# Patient Record
Sex: FEMALE | Race: WHITE | NOT HISPANIC OR LATINO
[De-identification: names, ages, dates, MRNs, and addresses within clinical notes are randomized per-mention and may not be internally consistent; named-entity substitution may affect disease eponyms.]

---

## 2018-03-27 ENCOUNTER — TRANSCRIPTION ENCOUNTER (OUTPATIENT)
Age: 63
End: 2018-03-27

## 2018-05-22 ENCOUNTER — RECORD ABSTRACTING (OUTPATIENT)
Age: 63
End: 2018-05-22

## 2018-05-22 DIAGNOSIS — Z82.49 FAMILY HISTORY OF ISCHEMIC HEART DISEASE AND OTHER DISEASES OF THE CIRCULATORY SYSTEM: ICD-10-CM

## 2018-05-22 DIAGNOSIS — Z78.9 OTHER SPECIFIED HEALTH STATUS: ICD-10-CM

## 2018-05-22 DIAGNOSIS — Z84.1 FAMILY HISTORY OF DISORDERS OF KIDNEY AND URETER: ICD-10-CM

## 2018-05-22 DIAGNOSIS — Z86.59 PERSONAL HISTORY OF OTHER MENTAL AND BEHAVIORAL DISORDERS: ICD-10-CM

## 2018-05-22 DIAGNOSIS — Z82.3 FAMILY HISTORY OF STROKE: ICD-10-CM

## 2018-05-22 DIAGNOSIS — Z87.19 PERSONAL HISTORY OF OTHER DISEASES OF THE DIGESTIVE SYSTEM: ICD-10-CM

## 2018-05-22 DIAGNOSIS — L65.9 NONSCARRING HAIR LOSS, UNSPECIFIED: ICD-10-CM

## 2018-05-22 RX ORDER — CHOLECALCIFEROL (VITAMIN D3) 25 MCG
TABLET ORAL
Refills: 0 | Status: ACTIVE | COMMUNITY

## 2018-05-22 RX ORDER — RIBOFLAVIN (VITAMIN B2) 100 MG
100 TABLET ORAL
Refills: 0 | Status: ACTIVE | COMMUNITY

## 2018-06-21 ENCOUNTER — APPOINTMENT (OUTPATIENT)
Dept: BREAST CENTER | Facility: CLINIC | Age: 63
End: 2018-06-21
Payer: COMMERCIAL

## 2018-06-21 VITALS
SYSTOLIC BLOOD PRESSURE: 95 MMHG | HEART RATE: 65 BPM | BODY MASS INDEX: 24.98 KG/M2 | WEIGHT: 134 LBS | DIASTOLIC BLOOD PRESSURE: 63 MMHG | HEIGHT: 61.5 IN

## 2018-06-21 DIAGNOSIS — N63.0 UNSPECIFIED LUMP IN UNSPECIFIED BREAST: ICD-10-CM

## 2018-06-21 PROCEDURE — 99213 OFFICE O/P EST LOW 20 MIN: CPT

## 2018-06-21 RX ORDER — CITALOPRAM HYDROBROMIDE 20 MG/1
20 TABLET, FILM COATED ORAL DAILY
Refills: 0 | Status: DISCONTINUED | COMMUNITY
End: 2018-06-21

## 2018-11-30 ENCOUNTER — RECORD ABSTRACTING (OUTPATIENT)
Age: 63
End: 2018-11-30

## 2018-11-30 DIAGNOSIS — Z87.19 PERSONAL HISTORY OF OTHER DISEASES OF THE DIGESTIVE SYSTEM: ICD-10-CM

## 2018-11-30 DIAGNOSIS — Z82.49 FAMILY HISTORY OF ISCHEMIC HEART DISEASE AND OTHER DISEASES OF THE CIRCULATORY SYSTEM: ICD-10-CM

## 2018-11-30 DIAGNOSIS — Z83.3 FAMILY HISTORY OF DIABETES MELLITUS: ICD-10-CM

## 2018-11-30 DIAGNOSIS — Z82.62 FAMILY HISTORY OF OSTEOPOROSIS: ICD-10-CM

## 2018-11-30 DIAGNOSIS — Z81.8 FAMILY HISTORY OF OTHER MENTAL AND BEHAVIORAL DISORDERS: ICD-10-CM

## 2018-11-30 DIAGNOSIS — Z87.440 PERSONAL HISTORY OF URINARY (TRACT) INFECTIONS: ICD-10-CM

## 2018-11-30 DIAGNOSIS — Z83.438 FAMILY HISTORY OF OTHER DISORDER OF LIPOPROTEIN METABOLISM AND OTHER LIPIDEMIA: ICD-10-CM

## 2018-11-30 DIAGNOSIS — Z83.49 FAMILY HISTORY OF OTHER ENDOCRINE, NUTRITIONAL AND METABOLIC DISEASES: ICD-10-CM

## 2018-11-30 DIAGNOSIS — Z83.518 FAMILY HISTORY OF OTHER SPECIFIED EYE DISORDER: ICD-10-CM

## 2018-11-30 LAB — CYTOLOGY CVX/VAG DOC THIN PREP: NORMAL

## 2018-11-30 RX ORDER — MULTIVIT-MIN/IRON/FOLIC ACID/K 18-600-40
400 CAPSULE ORAL
Refills: 0 | Status: ACTIVE | COMMUNITY

## 2018-12-05 ENCOUNTER — APPOINTMENT (OUTPATIENT)
Dept: PODIATRY | Facility: CLINIC | Age: 63
End: 2018-12-05
Payer: COMMERCIAL

## 2018-12-05 VITALS
HEIGHT: 61 IN | SYSTOLIC BLOOD PRESSURE: 100 MMHG | DIASTOLIC BLOOD PRESSURE: 64 MMHG | BODY MASS INDEX: 25.3 KG/M2 | WEIGHT: 134 LBS

## 2018-12-05 DIAGNOSIS — R22.40 LOCALIZED SWELLING, MASS AND LUMP, UNSPECIFIED LOWER LIMB: ICD-10-CM

## 2018-12-05 PROCEDURE — L3000: CPT | Mod: RT

## 2018-12-05 RX ORDER — CRANBERRY FRUIT EXTRACT 200 MG
CAPSULE ORAL
Refills: 0 | Status: DISCONTINUED | COMMUNITY
End: 2018-12-05

## 2018-12-05 RX ORDER — CITALOPRAM 20 MG/1
20 TABLET, FILM COATED ORAL
Refills: 0 | Status: DISCONTINUED | COMMUNITY
End: 2018-12-05

## 2019-01-19 ENCOUNTER — TRANSCRIPTION ENCOUNTER (OUTPATIENT)
Age: 64
End: 2019-01-19

## 2019-01-24 ENCOUNTER — APPOINTMENT (OUTPATIENT)
Dept: BREAST CENTER | Facility: CLINIC | Age: 64
End: 2019-01-24
Payer: COMMERCIAL

## 2019-01-24 VITALS
BODY MASS INDEX: 25.63 KG/M2 | HEART RATE: 73 BPM | SYSTOLIC BLOOD PRESSURE: 103 MMHG | DIASTOLIC BLOOD PRESSURE: 66 MMHG | WEIGHT: 134 LBS | HEIGHT: 60.5 IN

## 2019-01-24 DIAGNOSIS — Z87.410 PERSONAL HISTORY OF CERVICAL DYSPLASIA: ICD-10-CM

## 2019-01-24 DIAGNOSIS — Z87.19 PERSONAL HISTORY OF OTHER DISEASES OF THE DIGESTIVE SYSTEM: ICD-10-CM

## 2019-01-24 PROCEDURE — 99214 OFFICE O/P EST MOD 30 MIN: CPT

## 2019-01-24 RX ORDER — ASCORBIC ACID 125 MG
TABLET,CHEWABLE ORAL
Refills: 0 | Status: ACTIVE | COMMUNITY

## 2019-01-24 RX ORDER — PSYLLIUM HUSK 0.4 G
CAPSULE ORAL
Refills: 0 | Status: DISCONTINUED | COMMUNITY
End: 2019-01-24

## 2019-01-24 NOTE — HISTORY OF PRESENT ILLNESS
[FreeTextEntry1] : S/P R Br Bx w/NL (5/16/14): No resid papliloma\par S/P R Sono Core Bx (4/2/14): +papillary lesion\par +FH Br Ca (M. Aunt x 2 40's, M. FC 40's)\par TC Risk: 21.4%\par No MH/FH changes. ROS reviewed/discussed. Taking Vit D. Vit D level (3/17): 55. Last Bone Densitometry (2016): +osteopenia\par Mammo/Sono (10/4/18): NSF

## 2019-01-24 NOTE — PHYSICAL EXAM

## 2019-03-22 ENCOUNTER — APPOINTMENT (OUTPATIENT)
Dept: PODIATRY | Facility: CLINIC | Age: 64
End: 2019-03-22
Payer: COMMERCIAL

## 2019-03-22 VITALS
HEIGHT: 60 IN | WEIGHT: 134 LBS | DIASTOLIC BLOOD PRESSURE: 70 MMHG | SYSTOLIC BLOOD PRESSURE: 110 MMHG | BODY MASS INDEX: 26.31 KG/M2

## 2019-03-22 PROCEDURE — 99213 OFFICE O/P EST LOW 20 MIN: CPT | Mod: 25

## 2019-03-22 PROCEDURE — 73630 X-RAY EXAM OF FOOT: CPT | Mod: LT

## 2019-03-22 NOTE — REVIEW OF SYSTEMS
[As Noted in HPI] : as noted in HPI [Negative] : Neurological [FreeTextEntry9] : Large hallux valgus noted bilateral

## 2019-03-22 NOTE — HISTORY OF PRESENT ILLNESS
[FreeTextEntry1] : 4 months area of cc is the medial aspect of the left great toe and toes 2/3She states she gets combination of burning and aching

## 2019-08-08 ENCOUNTER — APPOINTMENT (OUTPATIENT)
Dept: PODIATRY | Facility: CLINIC | Age: 64
End: 2019-08-08
Payer: COMMERCIAL

## 2019-08-08 VITALS
DIASTOLIC BLOOD PRESSURE: 60 MMHG | BODY MASS INDEX: 26.5 KG/M2 | HEIGHT: 60 IN | WEIGHT: 135 LBS | SYSTOLIC BLOOD PRESSURE: 110 MMHG

## 2019-08-08 DIAGNOSIS — M77.9 ENTHESOPATHY, UNSPECIFIED: ICD-10-CM

## 2019-08-08 PROCEDURE — L3000: CPT | Mod: LT

## 2019-08-08 PROCEDURE — 99213 OFFICE O/P EST LOW 20 MIN: CPT | Mod: 25

## 2019-08-08 NOTE — HISTORY OF PRESENT ILLNESS
[FreeTextEntry1] : Patient has had a long standing history of chronic pain to the bottom of the lesser metatarsals bilateral well controlled with orthotic therapy. Patient does not want to take anti-inflammatory medication and states she could use another pair of orthotics she feels that the current pair does such a good job he would like to have them in a different pair of shoes. She also complains of ongoing pain in the plantar aspect of the right great toe however the examination is negative her convenience as a possible soft tissue the flexor tendon pathology

## 2019-08-08 NOTE — PROCEDURE
[FreeTextEntry1] : had a lengthy discussion with the patient regarding the diagnosis etiology and differential diagnosis as well as treatment options for the presenting problem. Risks alternatives and benefits of treatment ranging from conservative to surgical explained in great detail. I also explained the progression of treatment from conservative to possible surgical treatment options as well as the benefits of each. I do stress conservative treatment if in fact conservative treatment is an option until it no longer provides relief. Over-the-counter products medications padding, and splinting were reviewed as well. All questions asked and answered appropriately to the patient's satisfaction\par I have applied offloading pads to the patient's foot and have given them several samples to continue to use. I have also advised the patient that they can certainly purchase these from an outside vendor and if they are willing to do that and the name and number was supplied\par After a lengthy discussion with the patient regarding the possible benefits of orthotics and what we hope to achieve with them as it relates to their diagnosis the patient has agreed to be casted for the devices, The patient was then casted for a pair of custom functional foot orthoses with the subtalar joint in neutral, the forefoot locked, The patient was advised that they will be notified when the orthotics are returned from the laboratory, Should there be any questions or concerns they were advised to contact the office immediately, Educational literature regarding orthotics were dispensed, Included in the casting for orthotics was an overall gait exam and biomechanical evaluation\par

## 2019-08-08 NOTE — PHYSICAL EXAM
[General Appearance - Alert] : alert [General Appearance - In No Acute Distress] : in no acute distress [Full Pulse] : the pedal pulses are present [Edema] : there was no peripheral edema [Skin Color & Pigmentation] : normal skin color and pigmentation [Skin Turgor] : normal skin turgor [] : no rash [Deep Tendon Reflexes (DTR)] : deep tendon reflexes were 2+ and symmetric [Sensation] : the sensory exam was normal to light touch and pinprick [No Focal Deficits] : no focal deficits [Oriented To Time, Place, And Person] : oriented to person, place, and time [Impaired Insight] : insight and judgment were intact [Affect] : the affect was normal [FreeTextEntry1] : I had a lengthy discussion with the patient regarding bursitis the etiology and treatment options. I did explain to the patient what this means in medical as well as lay in terms and treatment options for bursitis. Since bursitis is an inflammation treatment options were reviewed including but were not limited to physical therapy, anti-inflammatory medication by mouth, steroid injection locally, offloading with orthotics, padding to the area, or a combination of all the above. The discussion with the patient regarding treatment options with and complete with all questions asked and answered appropriately. toe pain:  I had a lengthy discussion with the patient all questions asked and answered appropriately regarding a diagnosis of tendinitis/tenosynovitis. I did explain to the patient etiology of this diagnosis as well as treatment options. It is an inflammation of the tendons on the top of the foot. In many cases both pressure as well as excessive motion can cause inflammation of the tendons and is treated with a combination of immobilization, anti-inflammatories, physical therapy, or combination of all of the above. Patient was given at home exercises to do and I did explain to them that there is a need for strict compliance to my chosen treatment options.All questions asked and answered appropriately to the patient's satisfaction. , A complete and thorough evaluation of the type of shoes they should be wearing and type of shoes for this time of year was discussed with patient.\par

## 2019-08-15 ENCOUNTER — APPOINTMENT (OUTPATIENT)
Dept: BREAST CENTER | Facility: CLINIC | Age: 64
End: 2019-08-15
Payer: COMMERCIAL

## 2019-08-15 VITALS
SYSTOLIC BLOOD PRESSURE: 109 MMHG | BODY MASS INDEX: 24.87 KG/M2 | WEIGHT: 130 LBS | HEART RATE: 71 BPM | HEIGHT: 60.5 IN | DIASTOLIC BLOOD PRESSURE: 68 MMHG

## 2019-08-15 PROCEDURE — 99214 OFFICE O/P EST MOD 30 MIN: CPT

## 2019-08-15 NOTE — HISTORY OF PRESENT ILLNESS
[FreeTextEntry1] : S/P R Br Bx w/NL (5/16/14): No resid papliloma\par S/P R Sono Core Bx (4/2/14): +papillary lesion\par +FH Br Ca (M. Aunt x 2 40's, M. FC 40's)\par TC Risk: 21.4%\par No MH/FH changes. ROS reviewed/discussed. Taking Vit D. Vit D level (3/17): 55. Last Bone Densitometry (2016): +osteopenia\par Mammo/Sono (10/4/18): FG, NSF

## 2019-08-15 NOTE — PHYSICAL EXAM
[Normocephalic] : normocephalic [Atraumatic] : atraumatic [Supple] : supple [No Cervical Adenopathy] : no cervical adenopathy [No Supraclavicular Adenopathy] : no supraclavicular adenopathy [No Thyromegaly] : no thyromegaly [Normal Sinus Rhythm] : normal sinus rhythm [Examined in the supine and seated position] : examined in the supine and seated position [No dominant masses] : no dominant masses in right breast  [No dominant masses] : no dominant masses left breast [No Nipple Retraction] : no left nipple retraction [No Nipple Discharge] : no left nipple discharge [No Axillary Lymphadenopathy] : no left axillary lymphadenopathy [No Edema] : no edema [No Rashes] : no rashes [No Ulceration] : no ulceration

## 2019-09-16 ENCOUNTER — APPOINTMENT (OUTPATIENT)
Dept: PODIATRY | Facility: CLINIC | Age: 64
End: 2019-09-16
Payer: COMMERCIAL

## 2019-09-16 DIAGNOSIS — M77.41 METATARSALGIA, RIGHT FOOT: ICD-10-CM

## 2019-09-16 DIAGNOSIS — M77.42 METATARSALGIA, RIGHT FOOT: ICD-10-CM

## 2019-09-16 PROCEDURE — 99212 OFFICE O/P EST SF 10 MIN: CPT | Mod: NC

## 2019-09-16 PROCEDURE — ZZZZZ: CPT

## 2019-09-16 NOTE — HISTORY OF PRESENT ILLNESS
[FreeTextEntry1] : Patient has had a long standing history of chronic pain to the bottom of the lesser metatarsals bilateral well controlled with orthotic therapy.

## 2019-09-26 ENCOUNTER — TRANSCRIPTION ENCOUNTER (OUTPATIENT)
Age: 64
End: 2019-09-26

## 2019-10-07 ENCOUNTER — RESULT REVIEW (OUTPATIENT)
Age: 64
End: 2019-10-07

## 2020-02-14 ENCOUNTER — RESULT REVIEW (OUTPATIENT)
Age: 65
End: 2020-02-14

## 2020-02-14 ENCOUNTER — APPOINTMENT (OUTPATIENT)
Dept: HEMATOLOGY ONCOLOGY | Facility: CLINIC | Age: 65
End: 2020-02-14
Payer: COMMERCIAL

## 2020-02-14 VITALS
WEIGHT: 135 LBS | SYSTOLIC BLOOD PRESSURE: 102 MMHG | TEMPERATURE: 98.5 F | BODY MASS INDEX: 26.5 KG/M2 | DIASTOLIC BLOOD PRESSURE: 59 MMHG | HEIGHT: 60 IN | RESPIRATION RATE: 18 BRPM | HEART RATE: 62 BPM | OXYGEN SATURATION: 95 %

## 2020-02-14 DIAGNOSIS — M19.90 UNSPECIFIED OSTEOARTHRITIS, UNSPECIFIED SITE: ICD-10-CM

## 2020-02-14 PROCEDURE — 99205 OFFICE O/P NEW HI 60 MIN: CPT

## 2020-02-14 NOTE — PHYSICAL EXAM
[Fully active, able to carry on all pre-disease performance without restriction] : Status 0 - Fully active, able to carry on all pre-disease performance without restriction [Normal] : affect appropriate [de-identified] : swollen metacarpophalengeal joints

## 2020-02-14 NOTE — ASSESSMENT
[FreeTextEntry1] : Leucopenia\par Neutropeniia +/- lymphopenia\par At least since 3/2018\par PARTHA positive 1:320\par \par Extensively discussed about the differential diagnosis\par Her recent work up reviewed and discussed- B12, folate, copper, tsh normal\par Send ESR, CRP, CTD cascade, ANCA,  Hepatitis, HIV, myeloma panel, [facility] flow cytometry\par Suspect bone marrow suppression from systemic inflammation (autoimmune or connective tissue disorder- inflamed metacarpophalangeal joint)\par Refer to rheumatology\par If no obvious rheum diagnosis- will consider bone marrow\par \par Iron deficiency\par She had colonoscopy with Dr Oliveira in 2018\par No fatigue or tiredness\par \par Follow up in 3 weeks. No labs

## 2020-02-14 NOTE — CONSULT LETTER
[Dear  ___] : Dear  [unfilled], [Consult Letter:] : I had the pleasure of evaluating your patient, [unfilled]. [Please see my note below.] : Please see my note below. [Consult Closing:] : Thank you very much for allowing me to participate in the care of this patient.  If you have any questions, please do not hesitate to contact me. [Sincerely,] : Sincerely, [FreeTextEntry3] : Osito Lyn MD, MPH\par Attending Physician\par Hematology Oncology\par [facility] [place]\par [facility]\par

## 2020-02-14 NOTE — HISTORY OF PRESENT ILLNESS
[de-identified] : Ms. Radha Gates is very pleasant 64 year old female here for further evaluation for chronic neutropenia.\par \par Patient with history of arthritis and positive PARTHA who has intermittent neutropenia in the last two years with latest labs on 1/8/2020 showed wbc 2.8 hgb 12.1 hct 35.6 MCV - 94.4 Plts 178 AnC 1.2  Lymph # 1.2 She also had Ferritin of 40.4 on 3/28/2018 but never on supplement.\par  \par Patient with  maternal aunt with breast at age 40s and other aunt at 88.   \par No fevers chills night sweats\par No fatigue, tiredness, apathy\par No appetite loss or weight loss/weight gain\par No chest pain, shortness of breath, palpitation, dizziness\par No abdominal pain, nausea, vomiting, diarrhea, constipation\par No bright red blood per rectum, hematemesis or melena\par No hematuria, dysuria, frequency, urgency\par No headaches, visual changes, focal weakness, tingling, numbness\par \par \par

## 2020-02-14 NOTE — REVIEW OF SYSTEMS
[Constipation] : constipation [Joint Pain] : joint pain [Joint Stiffness] : joint stiffness [Negative] : Allergic/Immunologic [FreeTextEntry9] : +right shoulder impingement

## 2020-03-12 ENCOUNTER — APPOINTMENT (OUTPATIENT)
Dept: BREAST CENTER | Facility: CLINIC | Age: 65
End: 2020-03-12
Payer: COMMERCIAL

## 2020-03-12 VITALS
BODY MASS INDEX: 25.05 KG/M2 | HEIGHT: 60.5 IN | SYSTOLIC BLOOD PRESSURE: 109 MMHG | WEIGHT: 131 LBS | HEART RATE: 71 BPM | DIASTOLIC BLOOD PRESSURE: 60 MMHG

## 2020-03-12 PROCEDURE — 99214 OFFICE O/P EST MOD 30 MIN: CPT

## 2020-03-12 NOTE — HISTORY OF PRESENT ILLNESS
[FreeTextEntry1] : S/P R Br Bx w/NL (5/16/14): No resid papliloma\par S/P R Sono Core Bx (4/2/14): +papillary lesion\par +FH Br Ca (M. Aunt x 2 40's (BRCA-), M. FC 40's (BRCA-))\par TC Risk: 21.4%\par No MH/FH changes. ROS reviewed/discussed. Taking Vit D. Vit D level (3/17): 55. Last Bone Densitometry (2016): +osteopenia\par Mammo/Sono (10/7/19): FG, NSF

## 2020-03-13 ENCOUNTER — APPOINTMENT (OUTPATIENT)
Dept: HEMATOLOGY ONCOLOGY | Facility: CLINIC | Age: 65
End: 2020-03-13
Payer: COMMERCIAL

## 2020-03-13 VITALS
TEMPERATURE: 98.4 F | HEIGHT: 60.47 IN | SYSTOLIC BLOOD PRESSURE: 97 MMHG | BODY MASS INDEX: 25.95 KG/M2 | RESPIRATION RATE: 16 BRPM | WEIGHT: 135.7 LBS | HEART RATE: 62 BPM | OXYGEN SATURATION: 99 % | DIASTOLIC BLOOD PRESSURE: 65 MMHG

## 2020-03-13 PROCEDURE — 99213 OFFICE O/P EST LOW 20 MIN: CPT

## 2020-03-13 NOTE — ASSESSMENT
[FreeTextEntry1] : Leucopenia\par Neutropeniia +/- lymphopenia\par At least since 3/2018\par PARTHA positive 1:320\par PARTHA cascade suggestive of early CTD\par \par Extensively discussed about the findings\par She has mild leucopenia- possibly from CTD vs primary bone marrow disorder\par SHe is scheduled to see rheum in a few months\par B12, folate, copper, tsh- normal\par ESR, CRP,  ANCA,  Hepatitis, HIV, myeloma panel, [facility] flow cytometry- negative\par If counts decline- will consider bone marrow\par \par Iron deficiency\par She had colonoscopy with Dr Oliveira in 2018\par No fatigue or tiredness\par Does not want iron supplementation\par \par Follow up in 3-4 months\par CBC, CMP, ferritin, ESR, CRP\par

## 2020-03-13 NOTE — HISTORY OF PRESENT ILLNESS
[de-identified] : Ms. Radha Gates is very pleasant 64 year old female here for further evaluation for chronic neutropenia.\par \par Patient with history of arthritis and positive PARTHA who has intermittent neutropenia in the last two years with latest labs on 1/8/2020 showed wbc 2.8 hgb 12.1 hct 35.6 MCV - 94.4 Plts 178 AnC 1.2  Lymph # 1.2 She also had Ferritin of 40.4 on 3/28/2018 but never on supplement.\par  \par Patient with  maternal aunt with breast at age 40s and other aunt at 88.   \par No fevers chills night sweats\par No fatigue, tiredness, apathy\par No appetite loss or weight loss/weight gain\par No chest pain, shortness of breath, palpitation, dizziness\par No abdominal pain, nausea, vomiting, diarrhea, constipation\par No bright red blood per rectum, hematemesis or melena\par No hematuria, dysuria, frequency, urgency\par No headaches, visual changes, focal weakness, tingling, numbness\par \par \par   [de-identified] : SHe is seen today for follow up\par \par No new complaints\par

## 2020-03-13 NOTE — CONSULT LETTER
[Dear  ___] : Dear  [unfilled], [Please see my note below.] : Please see my note below. [Consult Closing:] : Thank you very much for allowing me to participate in the care of this patient.  If you have any questions, please do not hesitate to contact me. [Sincerely,] : Sincerely, [Courtesy Letter:] : I had the pleasure of seeing your patient, [unfilled], in my office today. [FreeTextEntry3] : Osito Lyn MD, MPH\par Attending Physician\par Hematology Oncology\par [facility] [place]\par [facility]\par

## 2020-03-13 NOTE — RESULTS/DATA
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)- Early connective tissue disease\par \par Ferritin 30

## 2020-03-13 NOTE — PHYSICAL EXAM
[Fully active, able to carry on all pre-disease performance without restriction] : Status 0 - Fully active, able to carry on all pre-disease performance without restriction [Normal] : affect appropriate [de-identified] : swollen metacarpophalengeal joints

## 2020-03-20 ENCOUNTER — APPOINTMENT (OUTPATIENT)
Dept: PODIATRY | Facility: CLINIC | Age: 65
End: 2020-03-20
Payer: COMMERCIAL

## 2020-03-20 VITALS — HEIGHT: 60.47 IN | WEIGHT: 135 LBS | BODY MASS INDEX: 25.82 KG/M2

## 2020-03-20 PROCEDURE — 99213 OFFICE O/P EST LOW 20 MIN: CPT

## 2020-03-20 NOTE — HISTORY OF PRESENT ILLNESS
[FreeTextEntry1] : Location- 2nd toe hammer toe\par Duration-several weeks, H toe is chronic, acute pain only a few weeks\par Past tx-nothing\par

## 2020-03-20 NOTE — PROCEDURE
[FreeTextEntry1] : had a lengthy discussion with the patient regarding the diagnosis etiology and differential diagnosis as well as treatment options for the presenting problem. Risks alternatives and benefits of treatment ranging from conservative to surgical explained in great detail. I also explained the progression of treatment from conservative to possible surgical treatment options as well as the benefits of each. I do stress conservative treatment if in fact conservative treatment is an option until it no longer provides relief. Over-the-counter products medications padding, and splinting were reviewed as well. All questions asked and answered appropriately to the patient's satisfaction\par I have applied offloading pads to the patient's foot and have given them several samples to continue to use. I have also advised the patient that they can certainly purchase these from an outside vendor and if they are willing to do that and the name and number was supplied\par

## 2020-03-20 NOTE — PHYSICAL EXAM
[General Appearance - Alert] : alert [General Appearance - In No Acute Distress] : in no acute distress [Full Pulse] : the pedal pulses are present [Edema] : there was no peripheral edema [Skin Color & Pigmentation] : normal skin color and pigmentation [Skin Turgor] : normal skin turgor [] : no rash [Deep Tendon Reflexes (DTR)] : deep tendon reflexes were 2+ and symmetric [Sensation] : the sensory exam was normal to light touch and pinprick [No Focal Deficits] : no focal deficits [FreeTextEntry1] : pre hk lesion pip joint 2 left

## 2020-03-20 NOTE — REVIEW OF SYSTEMS
[As Noted in HPI] : as noted in HPI [Joint Pain] : joint pain [Negative] : Integumentary [Leg Claudication] : no intermittent leg claudication [Lower Ext Edema] : no lower extremity edema

## 2020-04-26 ENCOUNTER — MESSAGE (OUTPATIENT)
Age: 65
End: 2020-04-26

## 2020-05-03 LAB
SARS-COV-2 IGG SERPL IA-ACNC: <0.1 INDEX
SARS-COV-2 IGG SERPL QL IA: NEGATIVE

## 2020-05-06 ENCOUNTER — APPOINTMENT (OUTPATIENT)
Dept: NEUROLOGY | Facility: CLINIC | Age: 65
End: 2020-05-06
Payer: COMMERCIAL

## 2020-05-06 PROCEDURE — 99203 OFFICE O/P NEW LOW 30 MIN: CPT | Mod: 95

## 2020-05-06 NOTE — PHYSICAL EXAM
[General Appearance - Alert] : alert [General Appearance - In No Acute Distress] : in no acute distress [General Appearance - Well-Appearing] : healthy appearing [] : normal voice and communication [Affect] : the affect was normal [Mood] : the mood was normal [Cranial Nerves Oculomotor (III)] : extraocular motion intact [Cranial Nerves Facial (VII)] : face symmetrical [Cranial Nerves Vestibulocochlear (VIII)] : hearing was intact bilaterally [Cranial Nerves Glossopharyngeal (IX)] : tongue and palate midline [Cranial Nerves Accessory (XI - Cranial And Spinal)] : head turning and shoulder shrug symmetric [Cranial Nerves Hypoglossal (XII)] : there was no tongue deviation with protrusion [Involuntary Movements] : no involuntary movements were seen [Motor Handedness Right-Handed] : the patient is right hand dominant [Abnormal Walk] : normal gait [Balance] : balance was intact [Coordination - Dysmetria Impaired Finger-to-Nose Bilateral] : present bilaterally [Tremor] : no tremor present [FreeTextEntry6] : moves all extremities normally.\par Finger tap normal bilat.\par Hand  normal bilat\par  [FreeTextEntry8] : No resting tremor. No abnormal postures.

## 2020-05-06 NOTE — HISTORY OF PRESENT ILLNESS
[Home] : at home, [unfilled] , at the time of the visit. [Other Location: e.g. 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 She is being evaluated for uncontrollable movements.. She has been in very good health and exercises regularly. Several months ago she began to notice episodic loss of control of her right hand when she would write. This was very brief and she attributed this to fatigue. About 10 days ago she had a brief episode of right arm numbness and weakness. She noticed that she was not able to control her arm. This resolved and everything was normal. She attributed this to a recent problem she has had with her right shoulder. She has been having pain in the shoulder and was diagnosed with an impingement. Then, about 4 days ago she suddenly lost control of her right arm while washing dishes. Her arm jerked away and she had trouble bringing it back to the dishes. This lasted for about a minute and then quickly resolved. She was speaking with her sister during this spell and notes that she had no difficulty understanding her sister and her sister had no trouble understanding her.\par \par She notes that she has no other neurologic symptoms. Between these spells her hand feels and functions normally. She has not started any new medications. About 10 days ago she had an episode of chills with a slight fever of 99.9 but this was better the next day.

## 2020-05-06 NOTE — ASSESSMENT
[FreeTextEntry1] : 1. Abnormal involuntary movements.\par \par Ms Gates has been experiencing episodic movements of the right upper extremity for several months. The movements have become more prominent and more concerning. Between the episodes, her hand and arm function normally and feel fine. She has not had headaches. Her limited examination today is normal.\par \par The episodic nature of these movements and loss of control suggest a focal motor seizure. I discussed this diagnosis with her today and advised her of testing which should be done. She agrees to a brain MRI and EEG. I will see her again after these studies are complete.

## 2020-05-06 NOTE — REVIEW OF SYSTEMS
[As Noted in HPI] : as noted in HPI [Negative] : Gastrointestinal [Difficulty with Language] : no ~M difficulty with language [Difficulty Walking] : no difficulty walking

## 2020-05-14 ENCOUNTER — APPOINTMENT (OUTPATIENT)
Dept: NEUROLOGY | Facility: CLINIC | Age: 65
End: 2020-05-14
Payer: COMMERCIAL

## 2020-05-14 PROCEDURE — 95819 EEG AWAKE AND ASLEEP: CPT

## 2020-05-21 ENCOUNTER — RESULT REVIEW (OUTPATIENT)
Age: 65
End: 2020-05-21

## 2020-05-21 DIAGNOSIS — S06.5X9A TRAUMATIC SUBDURAL HEMORRHAGE WITH LOSS OF CONSCIOUSNESS OF UNSPECIFIED DURATION, INITIAL ENCOUNTER: ICD-10-CM

## 2020-05-26 ENCOUNTER — APPOINTMENT (OUTPATIENT)
Dept: NEUROLOGY | Facility: CLINIC | Age: 65
End: 2020-05-26
Payer: COMMERCIAL

## 2020-05-26 PROCEDURE — 99213 OFFICE O/P EST LOW 20 MIN: CPT | Mod: 95

## 2020-05-27 ENCOUNTER — APPOINTMENT (OUTPATIENT)
Dept: NEUROSURGERY | Facility: CLINIC | Age: 65
End: 2020-05-27
Payer: COMMERCIAL

## 2020-05-27 VITALS
DIASTOLIC BLOOD PRESSURE: 73 MMHG | HEART RATE: 69 BPM | WEIGHT: 132 LBS | BODY MASS INDEX: 25.91 KG/M2 | HEIGHT: 60 IN | SYSTOLIC BLOOD PRESSURE: 111 MMHG

## 2020-05-27 DIAGNOSIS — S06.5X9A TRAUMATIC SUBDURAL HEMORRHAGE WITH LOSS OF CONSCIOUSNESS OF UNSPECIFIED DURATION, INITIAL ENCOUNTER: ICD-10-CM

## 2020-05-27 PROCEDURE — 99205 OFFICE O/P NEW HI 60 MIN: CPT

## 2020-05-27 NOTE — HISTORY OF PRESENT ILLNESS
[Home] : at home, [unfilled] , at the time of the visit. [Medical Office: ([facility])___] : at the medical office located in  [Partner] : partner [Verbal consent obtained from patient] : the patient, [unfilled] [FreeTextEntry1] : Ms Gates had a normal EEG but was found to have a small left hemisphere subdural hematoma. Dr Nobles contacted her with the results and rx'd Keppra but she has not started it yet. She is reluctant to take medications. She has not had any more spells since I last saw her. I advised her that it is likely the movements are focal seizures despite the normal EEG.\par \par I discussed the findings with her today.. I also discussed the use of Keppra and the potential for generalization of a focal seizure.

## 2020-05-27 NOTE — REASON FOR VISIT
[New Patient Visit] : a new patient visit [Referred By: _________] : Patient was referred by LOLLY [FreeTextEntry1] : Subdural hematoma

## 2020-05-27 NOTE — PHYSICAL EXAM
[General Appearance - Alert] : alert [General Appearance - In No Acute Distress] : in no acute distress [General Appearance - Well-Appearing] : healthy appearing [] : normal voice and communication [Mood] : the mood was normal [Affect] : the affect was normal [Cranial Nerves Facial (VII)] : face symmetrical [Cranial Nerves Vestibulocochlear (VIII)] : hearing was intact bilaterally [Cranial Nerves Accessory (XI - Cranial And Spinal)] : head turning and shoulder shrug symmetric [Involuntary Movements] : no involuntary movements were seen [Cranial Nerves Hypoglossal (XII)] : there was no tongue deviation with protrusion [Paresis Pronator Drift Right-Sided] : no pronator drift on the right [Paresis Pronator Drift Left-Sided] : no pronator drift on the left

## 2020-05-27 NOTE — CONSULT LETTER
[Dear  ___] : Dear  [unfilled], [Consult Closing:] : Thank you very much for allowing me to participate in the care of this patient.  If you have any questions, please do not hesitate to contact me. [Consult Letter:] : I had the pleasure of evaluating your patient, [unfilled]. [Please see my note below.] : Please see my note below. [Sincerely,] : Sincerely, [FreeTextEntry3] : Julien Hnog MD\par Neurosurgery\par

## 2020-05-27 NOTE — ASSESSMENT
[FreeTextEntry1] : 1. 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 I discussed the use of Keppra to prevent generalization of seizure. She is very reluctant to take Keppra and has an appointment with Dr Hong (neurosurgery) tomorrow and will consider starting it after talking with him.\par \par f/u 1 week.

## 2020-05-27 NOTE — HISTORY OF PRESENT ILLNESS
[FreeTextEntry1] : right arm involuntary movements [de-identified] : Ms. Gates is a 65 year-old female presenting with the above chief complaint.  She is a nurse here at [employer].  Several months ago she noticed that she was starting to have loss of control of her right hand, particularly when writing.  The episodes are intermittent and do not have a particular instigating event.  This has been associated with periods of right arm numbness and subjective weakness.  The way she describes it, its as if someone has "taken control of her right arm".  The events always go away on their own.  She recently saw a neurologist here at [place] who ordered an MRI and noted a small left subdural hematoma and was promptly referred to see a neurosurgeon.  Otherwise Ms. Gates feels well: she does not have any headaches or weakness otherwise.  She denies any specific head trauma.  She does not take anticoagulation of any kind.  Denies difficulty speaking or with her vision.

## 2020-05-27 NOTE — HISTORY OF PRESENT ILLNESS
[Home] : at home, [unfilled] , at the time of the visit. [Medical Office: ([facility])___] : at the medical office located in  [Verbal consent obtained from patient] : the patient, [unfilled] [Partner] : partner [FreeTextEntry1] : Ms Gates had a normal EEG but was found to have a small left hemisphere subdural hematoma. Dr Nobles contacted her with the results and rx'd Keppra but she has not started it yet. She is reluctant to take medications. She has not had any more spells since I last saw her. I advised her that it is likely the movements are focal seizures despite the normal EEG.\par \par I discussed the findings with her today.. I also discussed the use of Keppra and the potential for generalization of a focal seizure.

## 2020-05-27 NOTE — ASSESSMENT
[Subdural hematoma, chronic (432.1 / I62.03)] : postoperative [FreeTextEntry1] : I have discussed the natural history and treatment options for subdural hematoma with the patient. I explained the indications for observation and imaging surveillance, medical management, and surgery. I discussed the risks, benefits, possible complications and expected outcome related to each treatment option.  In the end my recommendation is to manage conservatively with serial imaging with a non-contrast head CT in 4 weeks to assess its stability or progression.  I offered her a course of steroids which may have some efficacy in stabilizing and reducing the size of chronic subdurals, however she has refused.  I have also counseled her on the importance taking anti-seizure medications as prescribed by the neurology team as she may be experiencing some type of focal seizures.  She agrees with the plan of care and verbalizes understanding.  All questions answered at time of visit.\par \bria Cuellar PA-C\bria

## 2020-05-27 NOTE — END OF VISIT
[FreeTextEntry3] : I have seen the patient and reviewed the case together with PA and I agree with the final recommendations and plan of care.\par \par Julien Hong MD\par Neurosurgery\par \par  [Time Spent: ___ minutes] : I have spent [unfilled] minutes of time on the encounter. [>50% of the face to face encounter time was spent on counseling and/or coordination of care for ___] : Greater than 50% of the face to face encounter time was spent on counseling and/or coordination of care for [unfilled]

## 2020-05-27 NOTE — PHYSICAL EXAM
[General Appearance - Alert] : alert [General Appearance - Well Nourished] : well nourished [General Appearance - In No Acute Distress] : in no acute distress [General Appearance - Well Developed] : well developed [General Appearance - Well-Appearing] : healthy appearing [Oriented To Time, Place, And Person] : oriented to person, place, and time [Impaired Insight] : insight and judgment were intact [Mood] : the mood was normal [Affect] : the affect was normal [Cranial Nerves Optic (II)] : visual acuity intact bilaterally,  pupils equal round and reactive to light [I: Normal Smell] : smell intact bilaterally [Cranial Nerves Trigeminal (V)] : facial sensation intact symmetrically [Cranial Nerves Oculomotor (III)] : extraocular motion intact [Cranial Nerves Facial (VII)] : face symmetrical [Cranial Nerves Vestibulocochlear (VIII)] : hearing was intact bilaterally [Cranial Nerves Accessory (XI - Cranial And Spinal)] : head turning and shoulder shrug symmetric [Cranial Nerves Glossopharyngeal (IX)] : tongue and palate midline [Cranial Nerves Hypoglossal (XII)] : there was no tongue deviation with protrusion [Motor Strength] : muscle strength was normal in all four extremities [Sensation Tactile Decrease] : light touch was intact [Abnormal Walk] : normal gait [Balance] : balance was intact

## 2020-05-27 NOTE — PHYSICAL EXAM
[General Appearance - Alert] : alert [General Appearance - In No Acute Distress] : in no acute distress [] : normal voice and communication [General Appearance - Well-Appearing] : healthy appearing [Mood] : the mood was normal [Affect] : the affect was normal [Cranial Nerves Facial (VII)] : face symmetrical [Cranial Nerves Vestibulocochlear (VIII)] : hearing was intact bilaterally [Involuntary Movements] : no involuntary movements were seen [Cranial Nerves Accessory (XI - Cranial And Spinal)] : head turning and shoulder shrug symmetric [Cranial Nerves Hypoglossal (XII)] : there was no tongue deviation with protrusion [Paresis Pronator Drift Right-Sided] : no pronator drift on the right [Paresis Pronator Drift Left-Sided] : no pronator drift on the left

## 2020-05-29 ENCOUNTER — APPOINTMENT (OUTPATIENT)
Dept: RHEUMATOLOGY | Facility: CLINIC | Age: 65
End: 2020-05-29
Payer: COMMERCIAL

## 2020-05-29 VITALS
WEIGHT: 136 LBS | DIASTOLIC BLOOD PRESSURE: 62 MMHG | HEIGHT: 60 IN | BODY MASS INDEX: 26.7 KG/M2 | SYSTOLIC BLOOD PRESSURE: 110 MMHG

## 2020-05-29 PROCEDURE — 36415 COLL VENOUS BLD VENIPUNCTURE: CPT

## 2020-05-29 PROCEDURE — 99204 OFFICE O/P NEW MOD 45 MIN: CPT | Mod: 25

## 2020-05-29 RX ORDER — LEVETIRACETAM 500 MG/1
500 TABLET, FILM COATED ORAL TWICE DAILY
Qty: 60 | Refills: 1 | Status: DISCONTINUED | COMMUNITY
Start: 2020-05-21 | End: 2020-05-29

## 2020-05-30 LAB
25(OH)D3 SERPL-MCNC: 61.3 NG/ML
ALBUMIN SERPL ELPH-MCNC: 4.3 G/DL
ALP BLD-CCNC: 81 U/L
ALT SERPL-CCNC: 21 U/L
ANION GAP SERPL CALC-SCNC: 12 MMOL/L
AST SERPL-CCNC: 22 U/L
BASOPHILS # BLD AUTO: 0.02 K/UL
BASOPHILS NFR BLD AUTO: 0.5 %
BILIRUB SERPL-MCNC: 0.2 MG/DL
BUN SERPL-MCNC: 20 MG/DL
CALCIUM SERPL-MCNC: 9.7 MG/DL
CHLORIDE SERPL-SCNC: 101 MMOL/L
CO2 SERPL-SCNC: 26 MMOL/L
CREAT SERPL-MCNC: 0.85 MG/DL
CRP SERPL-MCNC: <0.1 MG/DL
EOSINOPHIL # BLD AUTO: 0.05 K/UL
EOSINOPHIL NFR BLD AUTO: 1.3 %
ERYTHROCYTE [SEDIMENTATION RATE] IN BLOOD BY WESTERGREN METHOD: 8 MM/HR
GLUCOSE SERPL-MCNC: 97 MG/DL
HCT VFR BLD CALC: 38 %
HGB BLD-MCNC: 12 G/DL
IMM GRANULOCYTES NFR BLD AUTO: 0.3 %
LYMPHOCYTES # BLD AUTO: 1.83 K/UL
LYMPHOCYTES NFR BLD AUTO: 47.3 %
MAN DIFF?: NORMAL
MCHC RBC-ENTMCNC: 31.3 PG
MCHC RBC-ENTMCNC: 31.6 GM/DL
MCV RBC AUTO: 99 FL
MONOCYTES # BLD AUTO: 0.44 K/UL
MONOCYTES NFR BLD AUTO: 11.4 %
NEUTROPHILS # BLD AUTO: 1.52 K/UL
NEUTROPHILS NFR BLD AUTO: 39.2 %
PLATELET # BLD AUTO: 187 K/UL
POTASSIUM SERPL-SCNC: 4.2 MMOL/L
PROT SERPL-MCNC: 6.6 G/DL
RBC # BLD: 3.84 M/UL
RBC # FLD: 13.1 %
RHEUMATOID FACT SER QL: <10 IU/ML
SODIUM SERPL-SCNC: 139 MMOL/L
THYROGLOB AB SERPL-ACNC: <20 IU/ML
THYROPEROXIDASE AB SERPL IA-ACNC: 23.2 IU/ML
VIT B12 SERPL-MCNC: 1034 PG/ML
WBC # FLD AUTO: 3.87 K/UL

## 2020-05-31 NOTE — HISTORY OF PRESENT ILLNESS
[FreeTextEntry1] : 64 yo female referred by Dr. Lyn for further evaluation of + PARTHA.  She sees a functional medicine specialist at the [facility] in [place] since 2014 and a PMD once a year.  Her functional medicine doctor ran an PARTHA test a few times (she does not remember why), which was abnormal.\par She was told that it might be hematologic in nature, so she was sent to Dr. Lyn, who ordered more blood tests.\par She has arthritic hands and knees.  She is very active.\par She had fatigue, depression, GI symptoms when she went to see Functional Medicine doctor.  She is treated with MVI, vit C, Omega 3, mag citrate, vit D, Osteobend, Turmeric, and MK7.\par If she is overactive, her knees hurt.  She rarely has pain at rest.  She has a history of meniscal tear.  She has pain walking up and down the stairs and bending.  She has received Synvisc in her knees, which helped.  She has orthotics that she wears at work.\par No rashes.  No oral ulcers.  + dry eyes (gets pinguecula-uses OTC lubricant), no dry mouth\par No Raynauds\par Mother had myasthenia gravis and thyroid disease.  No other FH of autoimmune disease.

## 2020-05-31 NOTE — ASSESSMENT
[FreeTextEntry1] : PARTHA positive by MAMIE, but no Immunofluorescence done.  Will send PARTHA by IF, and complete serologies.  So far all specific serologies are negative.

## 2020-06-01 LAB — HLA-B27 RELATED AG QL: NORMAL

## 2020-06-02 LAB
ANA PAT FLD IF-IMP: ABNORMAL
ANA SER IF-ACNC: ABNORMAL
CCP AB SER IA-ACNC: <8 UNITS
RF+CCP IGG SER-IMP: NEGATIVE

## 2020-06-12 ENCOUNTER — APPOINTMENT (OUTPATIENT)
Dept: RHEUMATOLOGY | Facility: CLINIC | Age: 65
End: 2020-06-12
Payer: COMMERCIAL

## 2020-06-12 DIAGNOSIS — M15.9 POLYOSTEOARTHRITIS, UNSPECIFIED: ICD-10-CM

## 2020-06-12 DIAGNOSIS — G89.29 PAIN IN UNSPECIFIED JOINT: ICD-10-CM

## 2020-06-12 DIAGNOSIS — M25.50 PAIN IN UNSPECIFIED JOINT: ICD-10-CM

## 2020-06-12 PROCEDURE — 99212 OFFICE O/P EST SF 10 MIN: CPT | Mod: 95

## 2020-06-13 PROBLEM — M25.50 CHRONIC PAIN OF MULTIPLE JOINTS: Status: ACTIVE | Noted: 2020-05-29

## 2020-06-13 PROBLEM — M15.9 OSTEOARTHRITIS OF MULTIPLE JOINTS: Status: ACTIVE | Noted: 2020-06-13

## 2020-06-13 NOTE — HISTORY OF PRESENT ILLNESS
[Home] : at home, [unfilled] , at the time of the visit. [Medical Office: ([facility])___] : at the medical office located in  [Verbal consent obtained from patient] : the patient, [unfilled] [FreeTextEntry1] : She has occasional left foot or knee pain.  No joint swelling.\par No rashes.  No oral ulcers.\par She woke up with red and itchy ears.  she applied hydrocortisone cream and it went away after 3 days.

## 2020-06-26 ENCOUNTER — RESULT REVIEW (OUTPATIENT)
Age: 65
End: 2020-06-26

## 2020-07-06 ENCOUNTER — APPOINTMENT (OUTPATIENT)
Dept: NEUROSURGERY | Facility: CLINIC | Age: 65
End: 2020-07-06
Payer: COMMERCIAL

## 2020-07-06 DIAGNOSIS — I62.03 NONTRAUMATIC CHRONIC SUBDURAL HEMORRHAGE: ICD-10-CM

## 2020-07-06 PROCEDURE — 99215 OFFICE O/P EST HI 40 MIN: CPT

## 2020-07-06 NOTE — DATA REVIEWED
[de-identified] : Exam Date:      06/26/20\par Exam:         CT HEAD-NON STROKE\par Order#:       [id number]\par \par \par \par CT head without IV contrast\par \par  Clinical history: History of left subdural hematoma for follow-up\par \par  Comparison: MRI dated 5/21/2020\par \par  Serial axial 5 mm imaging was obtained through the calvarium.\par \par  Ventricles are midline and unchanged mildly enlarged. Mild periventricular low attenuation is\par apparent.\par \par  The prior left convexity subdural hematoma is no longer apparent.\par \par  No intra-axial mass, area of hemorrhage or acute area of evolving infarct. Posterior fossa temporal\par lobes intact.\par \par  Extra-axial space is unremarkable with enlarged sulci most prominently in the frontal region\par consistent with bifrontal atrophy. There is no extra-axial bleed.\par \par  The calvarium is intact.\par \par \par  IMPRESSION: Resolution of left convexity subdural hematoma.\par \par  Atrophy and mild deep white matter changes are present.\par \par  No acute intracranial abnormalities.\par \par  [place] CT Report             Final\par \par No Documents Attached\par \par \par \par \par   [facility]\par                                          [address]\par                                    [place], [place]  [address]\par                                        Department of Radiology\par                                             [phone]\par \par \par Patient Name:      LYNDA PEARCE                Location:       [facility]\par Med Rec #:        [id number]                    Account #:      [id number]\par YOB: 1955                    Ordering:       Kathy Cedillo\par Age: 65               Sex:    F                 Attending:      Kathy Cedillo\par PCP:        Paige Campoverde MD\par ______________________________________________________________________________________\par \par Exam Date:      06/26/20\par Exam:         CT HEAD-NON STROKE\par Order#:       [id number]\par \par \par \par CT head without IV contrast\par \par  Clinical history: History of left subdural hematoma for follow-up\par \par  Comparison: MRI dated 5/21/2020\par \par  Serial axial 5 mm imaging was obtained through the calvarium.\par \par  Ventricles are midline and unchanged mildly enlarged. Mild periventricular low attenuation is\par apparent.\par \par  The prior left convexity subdural hematoma is no longer apparent.\par \par  No intra-axial mass, area of hemorrhage or acute area of evolving infarct. Posterior fossa temporal\par lobes intact.\par \par  Extra-axial space is unremarkable with enlarged sulci most prominently in the frontal region\par consistent with bifrontal atrophy. There is no extra-axial bleed.\par \par  The calvarium is intact.\par \par \par  IMPRESSION: Resolution of left convexity subdural hematoma.\par \par  Atrophy and mild deep white matter changes are present.\par \par  No acute intracranial abnormalities.\par \par ***Electronically Signed ***\par -----------------------------------------------\par Rojas Quinteros MD              06/26/20 1640\par \par Dictated on 06/26/20 1629\par \par \par Report cc:  Kathy Cedillo PA;\par \par  \par \par  Ordered by: KATHY CEDILLO       Collected/Examined: 26Jun2020 04:08PM       \par Verification Required       Stage: Final       \par  Performed at: [facility]       Resulted: 26Jun2020 04:29PM       Last Updated: 26Jun2020 04:43PM       Accession: [id number]

## 2020-07-06 NOTE — PHYSICAL EXAM
[Person] : oriented to person [Short Term Intact] : short term memory intact [Place] : oriented to place [Time] : oriented to time [Remote Intact] : remote memory intact [Concentration Intact] : normal concentrating ability [Span Intact] : the attention span was normal [Comprehension] : comprehension intact [Fluency] : fluency intact [Current Events] : adequate knowledge of current events [Vocabulary] : adequate range of vocabulary [Past History] : adequate knowledge of personal past history [Cranial Nerves Oculomotor (III)] : extraocular motion intact [Cranial Nerves Optic (II)] : visual acuity intact bilaterally,  pupils equal round and reactive to light [Cranial Nerves Vestibulocochlear (VIII)] : hearing was intact bilaterally [Cranial Nerves Facial (VII)] : face symmetrical [Cranial Nerves Trigeminal (V)] : facial sensation intact symmetrically [Cranial Nerves Glossopharyngeal (IX)] : tongue and palate midline [Motor Strength] : muscle strength was normal in all four extremities [Motor Tone] : muscle tone was normal in all four extremities [Cranial Nerves Accessory (XI - Cranial And Spinal)] : head turning and shoulder shrug symmetric [Cranial Nerves Hypoglossal (XII)] : there was no tongue deviation with protrusion [Sensation Tactile Decrease] : light touch was intact [No Muscle Atrophy] : normal bulk in all four extremities [Abnormal Walk] : normal gait [Tremor] : no tremor present [Balance] : balance was intact [Past-pointing] : there was no past-pointing [2+] : Patella right 2+

## 2020-07-06 NOTE — ASSESSMENT
[FreeTextEntry1] : I have discussed the natural history and treatment options for subdural hematoma with the patient. I explained the indications for observation and imaging surveillance, medical management, and surgery. I discussed the risks, benefits, possible complications and expected outcome related to each treatment option.  At this point, there is radiographic evidence that the patient's subdural has resolved spontaneously. I explained the sid signs that should prompt emergency medical evaluation and patient understood them. Follow up with us as needed at this point.  \par

## 2020-07-06 NOTE — HISTORY OF PRESENT ILLNESS
[FreeTextEntry1] : Ms. Gates presents today for follow up appointment for management of subdural hematoma.  Previous visit and interval history were reviewed.  She states that the symptoms in her right hand have completely resolved.  She reports no headaches and no reported falls or trauma.  \par \par 5/27/2020\par Ms. Gates is a 65 year-old female presenting with the above chief complaint. She is a nurse here at [employer]. Several months ago she noticed that she was starting to have loss of control of her right hand, particularly when writing. The episodes are intermittent and do not have a particular instigating event. This has been associated with periods of right arm numbness and subjective weakness. The way she describes it, its as if someone has "taken control of her right arm". The events always go away on their own. She recently saw a neurologist here at [place] who ordered an MRI and noted a small left subdural hematoma and was promptly referred to see a neurosurgeon. Otherwise Ms. Gates feels well: she does not have any headaches or weakness otherwise. She denies any specific head trauma. She does not take anticoagulation of any kind. Denies difficulty speaking or with her vision.

## 2020-07-17 ENCOUNTER — RESULT REVIEW (OUTPATIENT)
Age: 65
End: 2020-07-17

## 2020-07-17 ENCOUNTER — APPOINTMENT (OUTPATIENT)
Dept: HEMATOLOGY ONCOLOGY | Facility: CLINIC | Age: 65
End: 2020-07-17
Payer: COMMERCIAL

## 2020-07-17 VITALS
TEMPERATURE: 98.3 F | RESPIRATION RATE: 18 BRPM | HEART RATE: 58 BPM | DIASTOLIC BLOOD PRESSURE: 55 MMHG | BODY MASS INDEX: 26.84 KG/M2 | WEIGHT: 136.7 LBS | HEIGHT: 60 IN | OXYGEN SATURATION: 97 % | SYSTOLIC BLOOD PRESSURE: 95 MMHG

## 2020-07-17 PROCEDURE — 99213 OFFICE O/P EST LOW 20 MIN: CPT

## 2020-07-17 NOTE — CONSULT LETTER
[Dear  ___] : Dear  [unfilled], [Courtesy Letter:] : I had the pleasure of seeing your patient, [unfilled], in my office today. [Please see my note below.] : Please see my note below. [Consult Closing:] : Thank you very much for allowing me to participate in the care of this patient.  If you have any questions, please do not hesitate to contact me. [Sincerely,] : Sincerely, [FreeTextEntry3] : Osito Lyn MD, MPH\par Attending Physician\par Hematology Oncology\par [facility]\par [facility]\par

## 2020-07-17 NOTE — HISTORY OF PRESENT ILLNESS
[de-identified] : SHe is seen today for follow up\par \par She had involuntary right hand movement few months back- had imaging found to have small left subdural hematoma- Saw Dr Nobles and Dr Hong \par She had a repeat imaging a month later - and it was resolved\par No further involuntary movements since \par  \par She had lupus work up Dr Jordan [de-identified] : Ms. Radha Gates is very pleasant 64 year old female here for further evaluation for chronic neutropenia.\par \par Patient with history of arthritis and positive PARTHA who has intermittent neutropenia in the last two years with latest labs on 1/8/2020 showed wbc 2.8 hgb 12.1 hct 35.6 MCV - 94.4 Plts 178 AnC 1.2  Lymph # 1.2 She also had Ferritin of 40.4 on 3/28/2018 but never on supplement.\par  \par Patient with  maternal aunt with breast at age 40s and other aunt at 88.   \par No fevers chills night sweats\par No fatigue, tiredness, apathy\par No appetite loss or weight loss/weight gain\par No chest pain, shortness of breath, palpitation, dizziness\par No abdominal pain, nausea, vomiting, diarrhea, constipation\par No bright red blood per rectum, hematemesis or melena\par No hematuria, dysuria, frequency, urgency\par No headaches, visual changes, focal weakness, tingling, numbness\par \par \par

## 2020-07-17 NOTE — REVIEW OF SYSTEMS
[Constipation] : constipation [Joint Stiffness] : joint stiffness [Joint Pain] : joint pain [Negative] : Allergic/Immunologic [FreeTextEntry9] : +right shoulder impingement

## 2020-07-17 NOTE — PHYSICAL EXAM
[Fully active, able to carry on all pre-disease performance without restriction] : Status 0 - Fully active, able to carry on all pre-disease performance without restriction [Normal] : affect appropriate [de-identified] : swollen metacarpophalengeal joints

## 2020-07-17 NOTE — RESULTS/DATA
[FreeTextEntry1] : Labs reviewed and discussed\par \par PARTHA cascade ([facility])- Early connective tissue disease\par \par Ferritin 30

## 2020-09-25 ENCOUNTER — APPOINTMENT (OUTPATIENT)
Dept: PODIATRY | Facility: CLINIC | Age: 65
End: 2020-09-25
Payer: COMMERCIAL

## 2020-09-25 VITALS — HEIGHT: 60 IN | WEIGHT: 136 LBS | BODY MASS INDEX: 26.7 KG/M2

## 2020-09-25 PROCEDURE — L3000: CPT | Mod: LT

## 2020-09-25 PROCEDURE — 99214 OFFICE O/P EST MOD 30 MIN: CPT | Mod: 25

## 2020-09-25 NOTE — REASON FOR VISIT
[Follow-Up Visit] : a follow-up visit for [Foot Deformity] : foot deformity [FreeTextEntry2] : left foot

## 2020-09-25 NOTE — HISTORY OF PRESENT ILLNESS
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

## 2020-09-25 NOTE — PROCEDURE
[FreeTextEntry1] : had a lengthy discussion with the patient regarding the diagnosis etiology and differential diagnosis as well as treatment options for the presenting problem. Risks alternatives and benefits of treatment ranging from conservative to surgical explained in great detail. I also explained the progression of treatment from conservative to possible surgical treatment options as well as the benefits of each. I do stress conservative treatment if in fact conservative treatment is an option until it no longer provides relief. Over-the-counter products medications padding, and splinting were reviewed as well. All questions asked and answered appropriately to the patient's satisfaction\par I had a lengthy discussion with the patient regarding surgical as well as conservative options. Conservative options included anti-inflammatory medication both over-the-counter and prescription. Other conservative measures including alteration of shoe gear as well as pads to go over the painful areas. Silipos shields were supplied for all bony pressure areas of pain. I then had a lengthy discussion with the patient regarding surgical intervention. I used x-rays bone models an erasable wax board and their own foot in the discussion. Educational literature was also dispensed. I explained surgical intervention as well as the risks alternatives and benefits to surgical intervention. I explained that it is elective surgery and the decision to continue would be theirs. I also explained that there is an option of no surgery and all. During my surgical evaluation and consultation, I explained the need for cutting and removal and possibly repositioning of bone. I discussed the difference between simply cutting bone and removing the painful bone and in the case of more severe deformities there is also the option of cutting repositioning and fixating the cut bone with either wire screw or staple or a combination of both 3. I explained to the patient that these decisions are usually made during the surgery however I explained to the patient given my findings today the anticipated but not definitive surgical procedure. I explained to the patient there are always choice is to be made during the surgery that sometimes we cannot predict prior to the surgery. Some of the risks explained were, but not limited to, infection recurrence of deformity chronic pain chronic swelling nerve injury injury to the blood vessels as well as the possible need for further surgery should the original procedure not entirely correct or overcorrect the problem. I explained the post operative course as well as the need to be in either a special surgical boot or a surgical shoe postop. I explained to the patient that the duration of time would be approximately 4-6 weeks and sometimes longer in this special type of postop shoe. I also explained to the patient that there would be a need for physical therapy after the surgery in order to decrease swelling and help increase and improve range of motion at the surgical sites. Postoperative compliance regarding dressing changes the type of shoes that they should be wearing compliance with physical therapy as well as keeping all postop appointments were stressed on several occasions. I explained to the patient there is no way to tell preoperatively how much swelling and pain it will have postop and this will directly relate to when they can get back into a shoe comfortably\par The patient was also told that formal Physical therapy will be required post op.\par The patient was told that in some cases wires/pins need to be implanted which can either be permanent or removable however the first choice  is always compression screws but when this is not possible either to poor bone bone stock or other unforeseen findings intraoperatively that K wire fixation is there is a possibility and this could delay healing. The patient was also to some cases been anticipated they will be required to use crutches postop. The patient was told that there is some intra-operative instances where they cannot be predicted preoperatively but there is a possibility.\par During the evaluation and management I had a lengthy discussion with the patient regarding benefits of functional foot orthoses. I explained to the patient the etiology and treatment options and one of them included the offloading and balancing of the painful portion of the foot. I explained the importance of balancing in offloading the painful area as part of the overall treatment process to advance healing. I have asked the patient to consider this as part of the treatment\par After a lengthy discussion with the patient regarding the possible benefits of orthotics and what we hope to achieve with them as it relates to their diagnosis the patient has agreed to be casted for the devices, The patient was then casted for a pair of custom functional foot orthoses with the subtalar joint in neutral, the forefoot locked, The patient was advised that they will be notified when the orthotics are returned from the laboratory, Should there be any questions or concerns they were advised to contact the office immediately, Educational literature regarding orthotics were dispensed, Included in the casting for orthotics was an overall gait exam and biomechanical evaluation\par weight bearing x-rays ordered\par

## 2020-09-25 NOTE — REVIEW OF SYSTEMS
[Arthralgias] : arthralgias [Joint Pain] : joint pain [Skin Lesions] : skin lesion [Negative] : Heme/Lymph [Limb Pain] : no limb pain [Limb Swelling] : no limb swelling [FreeTextEntry9] : HAV bilateral left > right,  hammer toe 2 left [de-identified] : dorsal pre HK lesion at PIP joint 2 left

## 2020-09-25 NOTE — PHYSICAL EXAM
[General Appearance - Alert] : alert [General Appearance - In No Acute Distress] : in no acute distress [Skin Color & Pigmentation] : normal skin color and pigmentation [Skin Turgor] : normal skin turgor [] : no rash [Sensation] : the sensory exam was normal to light touch and pinprick [No Focal Deficits] : no focal deficits [Deep Tendon Reflexes (DTR)] : deep tendon reflexes were 2+ and symmetric [Motor Exam] : the motor exam was normal [Oriented To Time, Place, And Person] : oriented to person, place, and time [Impaired Insight] : insight and judgment were intact [Affect] : the affect was normal [FreeTextEntry3] : Vascular exam reveals palpable pedal pulses, the foot is warm to touch, there was good capillary fill time, the skin is normal in appearance there is no evidence of vascular disease or compromise at this time [de-identified] : the patient has a moderate-to-severe hallux valgus deformity, with an inflamed and erythematous medial eminence, decreased range of motion dorsiflexion and plantar flexion, pain upon palpation of the area with tracking noted. The patient admits to pain in all types of shoe gear both "dress" and casual, and has noticed increase in both pain and deformity. There's decreased range of motion only approximately 30-45° before they complain of increased pain. They also have decreased plantar flexion and does admit to pain along the extensor tendon\par Evaluation of the painful hammertoe in question shows classic hammertoe deformity at the PIP joint where there is a dorsal contracture noted as well as a prominent extensor tendon there was then elevation and at the MP joint with plantar flexion at the PIP joint and DIP joint. There is a classic presentation of a hammertoe deformity and is  flexible and easily reduced\par  [Foot Ulcer] : no foot ulcer [Skin Induration] : no skin induration

## 2020-10-15 ENCOUNTER — RESULT REVIEW (OUTPATIENT)
Age: 65
End: 2020-10-15

## 2020-11-05 ENCOUNTER — APPOINTMENT (OUTPATIENT)
Dept: BREAST CENTER | Facility: CLINIC | Age: 65
End: 2020-11-05
Payer: COMMERCIAL

## 2020-11-05 VITALS
HEART RATE: 68 BPM | DIASTOLIC BLOOD PRESSURE: 66 MMHG | BODY MASS INDEX: 25.24 KG/M2 | HEIGHT: 60.5 IN | WEIGHT: 132 LBS | SYSTOLIC BLOOD PRESSURE: 100 MMHG

## 2020-11-05 DIAGNOSIS — Z12.31 ENCOUNTER FOR SCREENING MAMMOGRAM FOR MALIGNANT NEOPLASM OF BREAST: ICD-10-CM

## 2020-11-05 PROCEDURE — 99072 ADDL SUPL MATRL&STAF TM PHE: CPT

## 2020-11-05 PROCEDURE — 99214 OFFICE O/P EST MOD 30 MIN: CPT

## 2020-11-05 NOTE — HISTORY OF PRESENT ILLNESS
[FreeTextEntry1] : S/P R Br Bx w/NL (5/16/14): No resid papliloma\par S/P R Sono Core Bx (4/2/14): +papillary lesion\par +FH Br Ca (M. Aunt x 2 40's (BRCA-), M. FC 40's (BRCA-))\par TC Risk: 21.4%\par No MH/FH changes. ROS reviewed/discussed. Taking Vit D. Vit D level (3/17): 55. Last Bone Densitometry (2016): +osteopenia\par Mammo/Sono (10/15/20): HD, NSF

## 2020-11-06 ENCOUNTER — RESULT REVIEW (OUTPATIENT)
Age: 65
End: 2020-11-06

## 2020-11-06 ENCOUNTER — APPOINTMENT (OUTPATIENT)
Dept: HEMATOLOGY ONCOLOGY | Facility: CLINIC | Age: 65
End: 2020-11-06
Payer: COMMERCIAL

## 2020-11-06 VITALS
BODY MASS INDEX: 25.44 KG/M2 | DIASTOLIC BLOOD PRESSURE: 60 MMHG | HEIGHT: 60.5 IN | OXYGEN SATURATION: 98 % | HEART RATE: 63 BPM | RESPIRATION RATE: 18 BRPM | TEMPERATURE: 97.9 F | SYSTOLIC BLOOD PRESSURE: 103 MMHG | WEIGHT: 133 LBS

## 2020-11-06 DIAGNOSIS — R76.8 OTHER SPECIFIED ABNORMAL IMMUNOLOGICAL FINDINGS IN SERUM: ICD-10-CM

## 2020-11-06 PROCEDURE — 99214 OFFICE O/P EST MOD 30 MIN: CPT

## 2020-11-06 PROCEDURE — 99072 ADDL SUPL MATRL&STAF TM PHE: CPT

## 2020-11-06 NOTE — PHYSICAL EXAM
[Fully active, able to carry on all pre-disease performance without restriction] : Status 0 - Fully active, able to carry on all pre-disease performance without restriction [Normal] : affect appropriate [de-identified] : swollen metacarpophalengeal joints

## 2020-11-06 NOTE — CONSULT LETTER
[Dear  ___] : Dear  [unfilled], [Courtesy Letter:] : I had the pleasure of seeing your patient, [unfilled], in my office today. [Please see my note below.] : Please see my note below. [Consult Closing:] : Thank you very much for allowing me to participate in the care of this patient.  If you have any questions, please do not hesitate to contact me. [Sincerely,] : Sincerely, [FreeTextEntry3] : Osito Lyn MD, MPH\par Attending Physician\par Hematology Oncology\par  [facility]\par [facility]\par

## 2020-11-06 NOTE — HISTORY OF PRESENT ILLNESS
[de-identified] : Ms. Radha Gates is very pleasant 64 year old female here for further evaluation for chronic neutropenia.\par \par Patient with history of arthritis and positive PARTHA who has intermittent neutropenia in the last two years with latest labs on 1/8/2020 showed wbc 2.8 hgb 12.1 hct 35.6 MCV - 94.4 Plts 178 AnC 1.2  Lymph # 1.2 She also had Ferritin of 40.4 on 3/28/2018 but never on supplement.\par  \par Patient with  maternal aunt with breast at age 40s and other aunt at 88.   \par No fevers chills night sweats\par No fatigue, tiredness, apathy\par No appetite loss or weight loss/weight gain\par No chest pain, shortness of breath, palpitation, dizziness\par No abdominal pain, nausea, vomiting, diarrhea, constipation\par No bright red blood per rectum, hematemesis or melena\par No hematuria, dysuria, frequency, urgency\par No headaches, visual changes, focal weakness, tingling, numbness\par \par \par   [de-identified] : SHe is seen today for follow up for leukopenia reports of doing well, still works as RN in endoscopy. Patient  has not had any infection in the last few months, still follows with her functional practioner for supplements. She's scheduled for Bunionectomyn and hammer toe on left foot in December 2020, will do blood work prior surgery. \par She had lupus work up Dr Jordan

## 2020-11-06 NOTE — ASSESSMENT
[FreeTextEntry1] : Leucopenia\par Neutropeniia +/- lymphopenia\par At least since 3/2018\par PARTHA positive 1:320\par PARTHA cascade suggestive of early CTD\par B12, folate, copper, tsh- normal\par ESR, CRP,  ANCA,  Hepatitis, HIV, myeloma panel, [facility] flow cytometry- negative\par \par SHe is seen today for follow up\par Feels good overall\par Labs reviewed and discussed\par SHe saw Dr Jordan \par Monitor counts for now\par Patient understands that if her counts continue to decline, we'll consider doing bone marrow. \par \par Iron deficiency\par She had colonoscopy with Dr Oliveira in 2018\par No fatigue or tiredness\par Discussed again about PO vs IV iron\par She wants to hold off for now\par Advised to follow up with Dr Oliveira\par new Ferritin ordered - advised to call back to discuss result. \par \par d/w Dr. Lyn \par Follow up in 6 months\par CBC, CMP, ferritin\par

## 2020-11-06 NOTE — RESULTS/DATA
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

## 2020-11-06 NOTE — REVIEW OF SYSTEMS
[Negative] : Constitutional [Constipation] : no constipation [FreeTextEntry2] : 10 point review of systems negative except as outlined in HPI

## 2020-11-14 ENCOUNTER — APPOINTMENT (OUTPATIENT)
Dept: INTERNAL MEDICINE | Facility: CLINIC | Age: 65
End: 2020-11-14
Payer: COMMERCIAL

## 2020-11-14 VITALS
TEMPERATURE: 97.7 F | HEART RATE: 68 BPM | HEIGHT: 60.5 IN | DIASTOLIC BLOOD PRESSURE: 70 MMHG | SYSTOLIC BLOOD PRESSURE: 118 MMHG | WEIGHT: 132 LBS | BODY MASS INDEX: 25.24 KG/M2

## 2020-11-14 DIAGNOSIS — Z76.89 PERSONS ENCOUNTERING HEALTH SERVICES IN OTHER SPECIFIED CIRCUMSTANCES: ICD-10-CM

## 2020-11-14 DIAGNOSIS — Z23 ENCOUNTER FOR IMMUNIZATION: ICD-10-CM

## 2020-11-14 LAB
HCV AB SER QL: NEGATIVE
HIV1+2 AB SPEC QL IA.RAPID: NEGATIVE

## 2020-11-14 PROCEDURE — 99072 ADDL SUPL MATRL&STAF TM PHE: CPT

## 2020-11-14 PROCEDURE — 90662 IIV NO PRSV INCREASED AG IM: CPT

## 2020-11-14 PROCEDURE — 99204 OFFICE O/P NEW MOD 45 MIN: CPT | Mod: 25

## 2020-11-14 PROCEDURE — G0008: CPT

## 2020-11-14 RX ORDER — CALCIUM CARBONATE/VITAMIN D3 600 MG-10
TABLET ORAL DAILY
Refills: 0 | Status: ACTIVE | COMMUNITY

## 2020-11-16 PROBLEM — Z23 NEED FOR VACCINATION AGAINST STREPTOCOCCUS PNEUMONIAE USING PNEUMOCOCCAL CONJUGATE VACCINE 13: Status: ACTIVE | Noted: 2020-11-16

## 2020-11-16 PROBLEM — Z23 ENCOUNTER FOR IMMUNIZATION: Status: ACTIVE | Noted: 2020-11-14

## 2020-11-18 ENCOUNTER — APPOINTMENT (OUTPATIENT)
Dept: PODIATRY | Facility: CLINIC | Age: 65
End: 2020-11-18
Payer: COMMERCIAL

## 2020-11-18 ENCOUNTER — RESULT REVIEW (OUTPATIENT)
Age: 65
End: 2020-11-18

## 2020-11-18 VITALS — WEIGHT: 132 LBS | HEIGHT: 60.5 IN | BODY MASS INDEX: 25.24 KG/M2

## 2020-11-18 PROCEDURE — 99214 OFFICE O/P EST MOD 30 MIN: CPT | Mod: 57

## 2020-11-19 NOTE — REVIEW OF SYSTEMS
[Negative] : Heme/Lymph [Fever] : no fever [Chills] : no chills [Feeling Poorly] : not feeling poorly [Feeling Tired] : not feeling tired [Leg Claudication] : no intermittent leg claudication [Lower Ext Edema] : no lower extremity edema [Arthralgias] : no arthralgias [Joint Swelling] : no joint swelling [Joint Stiffness] : no joint stiffness [Limb Pain] : no limb pain [Limb Swelling] : no limb swelling

## 2020-11-19 NOTE — HISTORY OF PRESENT ILLNESS
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

## 2020-11-19 NOTE — REASON FOR VISIT
[Follow-Up Visit] : a follow-up visit for [Foot Deformity] : foot deformity [FreeTextEntry2] : worsening left foot bunion great toe and hammer toe 2nd left

## 2020-11-19 NOTE — PROCEDURE
[FreeTextEntry1] : The weightbearing x-rays have been reviewed, d/w patient and deformities highlighted. They have been compared to prior x-rays taken in the office and there was a noticeable increase in the intermetatarsal angle and clinically one notes increased hallux abductus and an overlap of the second toe. The second toe does have a deviation medially and the contracture at the MTP joint. In addition the x-rays highlight any non-congruous second MTP joint with medial deviation. This was highlighted and it was explained the difficulty of the toe to remain rectus and maintain in a sagittal plane correction postoperatively and she should expect some mild elevation and medial deviation permanently postop given a chronic nature and advanced deformity\par I had a lengthy discussion with the patient regarding surgical as well as conservative options. Conservative options included anti-inflammatory medication both over-the-counter and prescription. Other conservative measures including alteration of shoe gear as well as pads to go over the painful areas. Silipos shields were supplied for all bony pressure areas of pain. I then had a lengthy discussion with the patient regarding surgical intervention. I used x-rays bone models an erasable wax board and their own foot in the discussion. Educational literature was also dispensed. I explained surgical intervention as well as the risks alternatives and benefits to surgical intervention. I explained that it is elective surgery and the decision to continue would be theirs. I also explained that there is an option of no surgery and all. During my surgical evaluation and consultation, I explained the need for cutting and removal and possibly repositioning of bone. I discussed the difference between simply cutting bone and removing the painful bone and in the case of more severe deformities there is also the option of cutting repositioning and fixating the cut bone with either wire screw or staple or a combination of both 3. I explained to the patient that these decisions are usually made during the surgery however I explained to the patient given my findings today the anticipated but not definitive surgical procedure. I explained to the patient there are always choice is to be made during the surgery that sometimes we cannot predict prior to the surgery. Some of the risks explained were, but not limited to, infection recurrence of deformity chronic pain chronic swelling nerve injury injury to the blood vessels as well as the possible need for further surgery should the original procedure not entirely correct or overcorrect the problem. I explained the post operative course as well as the need to be in either a special surgical boot or a surgical shoe postop. I explained to the patient that the duration of time would be approximately 4-6 weeks and sometimes longer in this special type of postop shoe. I also explained to the patient that there would be a need for physical therapy after the surgery in order to decrease swelling and help increase and improve range of motion at the surgical sites. Postoperative compliance regarding dressing changes the type of shoes that they should be wearing compliance with physical therapy as well as keeping all postop appointments were stressed on several occasions. I explained to the patient there is no way to tell preoperatively how much swelling and pain it will have postop and this will directly relate to when they can get back into a shoe comfortably\par The patient was also told that formal Physical therapy will be required post op.\par The patient was told that in some cases wires/pins need to be implanted which can either be permanent or removable however the first choice  is always compression screws but when this is not possible either to poor bone bone stock or other unforeseen findings intraoperatively that K wire fixation is there is a possibility and this could delay healing. The patient was also to some cases been anticipated they will be required to use crutches postop. \par The patient wished to proceed\par Today since the patient wished to proceed with surgical intervention or preoperative paperwork was filled out copy and scanned into his medical record. Preoperative orders, postoperative orders, postoperative expectations, as well as consent was filled out diagrams issued regarding the type of surgery to be performed and all questions asked and answered appropriately regarding the anticipated surgeries as well as the risks alternatives and benefits.\par follow up appt post op

## 2020-11-19 NOTE — PHYSICAL EXAM
[General Appearance - Alert] : alert [General Appearance - In No Acute Distress] : in no acute distress [No Joint Swelling] : no joint swelling [Pes Planus] : pes planus deformity [Normal Foot/Ankle] : Both lower extremities were exposed and visualized. Standing exam demonstrates normal foot posture and alignment. Hindfoot exam shows no hindfoot valgus or varus [Skin Color & Pigmentation] : normal skin color and pigmentation [Skin Turgor] : normal skin turgor [] : no rash [Skin Lesions] : no skin lesions [Sensation] : the sensory exam was normal to light touch and pinprick [No Focal Deficits] : no focal deficits [Deep Tendon Reflexes (DTR)] : deep tendon reflexes were 2+ and symmetric [Motor Exam] : the motor exam was normal [Oriented To Time, Place, And Person] : oriented to person, place, and time [Impaired Insight] : insight and judgment were intact [Affect] : the affect was normal [FreeTextEntry3] : Vascular exam reveals palpable pedal pulses, the foot is warm to touch, there was good capillary fill time, the skin is normal in appearance there is no evidence of vascular disease or compromise at this time [de-identified] : the patient has a moderate-to-severe hallux valgus deformity, with an inflamed and erythematous medial eminence, decreased range of motion dorsiflexion and plantar flexion, pain upon palpation of the area with tracking noted. The patient admits to pain in all types of shoe gear both "dress" and casual, and has noticed increase in both pain and deformity. There's decreased range of motion only approximately 30-45° before they complain of increased pain. They also have decreased plantar flexion and does admit to pain along the extensor tendon\par Evaluation of the painful hammertoe in question shows classic hammertoe deformity at the PIP joint where there is a dorsal contracture noted as well as a prominent extensor tendon there was then elevation and at the MP joint with plantar flexion at the PIP joint and DIP joint. There is a classic presentation of a hammertoe deformity and is  flexible and easily reduced\par  [Foot Ulcer] : no foot ulcer [Skin Induration] : no skin induration

## 2020-12-01 ENCOUNTER — RESULT CHARGE (OUTPATIENT)
Age: 65
End: 2020-12-01

## 2020-12-01 DIAGNOSIS — Z01.818 ENCOUNTER FOR OTHER PREPROCEDURAL EXAMINATION: ICD-10-CM

## 2020-12-02 DIAGNOSIS — R05 COUGH: ICD-10-CM

## 2020-12-03 ENCOUNTER — RESULT REVIEW (OUTPATIENT)
Age: 65
End: 2020-12-03

## 2020-12-09 LAB
ALBUMIN SERPL ELPH-MCNC: 4.4 G/DL
ALP BLD-CCNC: 96 U/L
ALT SERPL-CCNC: 13 U/L
ANION GAP SERPL CALC-SCNC: 7 MMOL/L
APPEARANCE: CLEAR
AST SERPL-CCNC: 16 U/L
BACTERIA: NEGATIVE
BASOPHILS # BLD AUTO: 0.02 K/UL
BASOPHILS NFR BLD AUTO: 0.5 %
BILIRUB SERPL-MCNC: 0.2 MG/DL
BILIRUBIN URINE: NEGATIVE
BLOOD URINE: NEGATIVE
BUN SERPL-MCNC: 23 MG/DL
CALCIUM SERPL-MCNC: 10 MG/DL
CHLORIDE SERPL-SCNC: 99 MMOL/L
CO2 SERPL-SCNC: 31 MMOL/L
COLOR: NORMAL
CREAT SERPL-MCNC: 0.92 MG/DL
EOSINOPHIL # BLD AUTO: 0.07 K/UL
EOSINOPHIL NFR BLD AUTO: 1.6 %
GLUCOSE QUALITATIVE U: NEGATIVE
GLUCOSE SERPL-MCNC: 85 MG/DL
HCT VFR BLD CALC: 38.5 %
HGB BLD-MCNC: 12.5 G/DL
HYALINE CASTS: 0 /LPF
IMM GRANULOCYTES NFR BLD AUTO: 0.2 %
KETONES URINE: NEGATIVE
LEUKOCYTE ESTERASE URINE: NEGATIVE
LYMPHOCYTES # BLD AUTO: 2.14 K/UL
LYMPHOCYTES NFR BLD AUTO: 48.5 %
MAN DIFF?: NORMAL
MCHC RBC-ENTMCNC: 31.2 PG
MCHC RBC-ENTMCNC: 32.5 GM/DL
MCV RBC AUTO: 96 FL
MICROSCOPIC-UA: NORMAL
MONOCYTES # BLD AUTO: 0.54 K/UL
MONOCYTES NFR BLD AUTO: 12.2 %
NEUTROPHILS # BLD AUTO: 1.63 K/UL
NEUTROPHILS NFR BLD AUTO: 37 %
NITRITE URINE: NEGATIVE
PH URINE: 7.5
PLATELET # BLD AUTO: 221 K/UL
POTASSIUM SERPL-SCNC: 4.1 MMOL/L
PROT SERPL-MCNC: 6.8 G/DL
PROTEIN URINE: NEGATIVE
RBC # BLD: 4.01 M/UL
RBC # FLD: 12.6 %
RED BLOOD CELLS URINE: 0 /HPF
SODIUM SERPL-SCNC: 138 MMOL/L
SPECIFIC GRAVITY URINE: 1.01
SQUAMOUS EPITHELIAL CELLS: 0 /HPF
UROBILINOGEN URINE: NORMAL
WBC # FLD AUTO: 4.41 K/UL
WHITE BLOOD CELLS URINE: 1 /HPF

## 2020-12-12 ENCOUNTER — RESULT REVIEW (OUTPATIENT)
Age: 65
End: 2020-12-12

## 2020-12-12 NOTE — HEALTH RISK ASSESSMENT
[Very Good] : ~his/her~  mood as very good [Yes] : Yes [1 or 2 (0 pts)] : 1 or 2 (0 points) [Never (0 pts)] : Never (0 points) [No] : In the past 12 months have you used drugs other than those required for medical reasons? No [Two or more falls in past year] : Patient reported two or more falls in the past year [0] : 2) Feeling down, depressed, or hopeless: Not at all (0) [None] : None [With Significant Other] : lives with significant other [Employed] : employed [College] : College [] :  [Sexually Active] : sexually active [Feels Safe at Home] : Feels safe at home [Fully functional (bathing, dressing, toileting, transferring, walking, feeding)] : Fully functional (bathing, dressing, toileting, transferring, walking, feeding) [Fully functional (using the telephone, shopping, preparing meals, housekeeping, doing laundry, using] : Fully functional and needs no help or supervision to perform IADLs (using the telephone, shopping, preparing meals, housekeeping, doing laundry, using transportation, managing medications and managing finances) [Reports changes in vision] : Reports changes in vision [Smoke Detector] : smoke detector [Carbon Monoxide Detector] : carbon monoxide detector [Safety elements used in home] : safety elements used in home [Seat Belt] :  uses seat belt [Sunscreen] : uses sunscreen [Monthly or less (1 pt)] : Monthly or less (1 point) [Patient/Caregiver not ready to engage] : Patient/Caregiver not ready to engage [] : No [Audit-CScore] : 1 [de-identified] : Gym and hiking  [de-identified] : Gluten free and low carbs  [de-identified] : left wrist in jury  [[id number]] : 0 [Change in mental status noted] : No change in mental status noted [Language] : denies difficulty with language [Behavior] : denies difficulty with behavior [Learning/Retaining New Information] : denies difficulty learning/retaining new information [Handling Complex Tasks] : denies difficulty handling complex tasks [Reasoning] : denies difficulty with reasoning [Spatial Ability and Orientation] : denies difficulty with spatial ability and orientation [Reports changes in hearing] : Reports no changes in hearing [Reports changes in dental health] : Reports no changes in dental health [Guns at Home] : no guns at home [Travel to Developing Areas] : does not  travel to developing areas [TB Exposure] : is not being exposed to tuberculosis [AdvancecareDate] : 11/14/20

## 2020-12-13 ENCOUNTER — RESULT REVIEW (OUTPATIENT)
Age: 65
End: 2020-12-13

## 2020-12-14 ENCOUNTER — RESULT REVIEW (OUTPATIENT)
Age: 65
End: 2020-12-14

## 2020-12-22 ENCOUNTER — APPOINTMENT (OUTPATIENT)
Dept: PODIATRY | Facility: CLINIC | Age: 65
End: 2020-12-22
Payer: COMMERCIAL

## 2020-12-22 VITALS — WEIGHT: 132 LBS | HEIGHT: 60.5 IN | BODY MASS INDEX: 25.24 KG/M2

## 2020-12-22 PROCEDURE — 99024 POSTOP FOLLOW-UP VISIT: CPT

## 2020-12-22 RX ORDER — CEPHALEXIN 500 MG/1
500 CAPSULE ORAL 3 TIMES DAILY
Qty: 21 | Refills: 1 | Status: DISCONTINUED | COMMUNITY
Start: 2020-11-18 | End: 2020-12-22

## 2020-12-22 NOTE — HISTORY OF PRESENT ILLNESS
[FreeTextEntry1] : Location-left foot\par Duration- DOS 12/9/2020 Tramaine/Akin/@nd ray toe and dislocation \par NWB doing well, minimal c/o pain\par

## 2020-12-22 NOTE — REVIEW OF SYSTEMS
[Fever] : no fever [Chills] : no chills [Chest Pain] : no chest pain [Leg Claudication] : no intermittent leg claudication [Lower Ext Edema] : no lower extremity edema [Shortness Of Breath] : no shortness of breath

## 2020-12-22 NOTE — PHYSICAL EXAM
[General Appearance - Alert] : alert [General Appearance - In No Acute Distress] : in no acute distress [FreeTextEntry3] : Vascular exam reveals palpable pedal pulses, the foot is warm to touch, there was good capillary fill time, the skin is normal in appearance there is no evidence of vascular disease or compromise at this time [de-identified] : NWB on LLE, minimal complaints [FreeTextEntry1] : no cellulitis, no tunneling, no sinus tract, no drainage, no odor, mild periwound erythema, no evidence of infection\par dressing CDI, dermabond in place, k-wire intact no sign of pin tract infection

## 2020-12-22 NOTE — PROCEDURE
[FreeTextEntry1] : A dry sterile dressing was applied and patient was advised to stay in the appropriate shoe gear and offloading until further notice.\par A lengthy discussion with the patient at this time regarding their current diagnosis, surgical status and prognosis. I advised the patient that if they continue to follow my postoperative instructions regarding limited progressive weight bearing and activity level as well as continued formal physical therapy as well as physical therapy at home coupled with continued ice, elevation and offloading the surgical site they will continue to progress as expected. If, however, the patient does not follow my instructions and exhibits medical non compliance they run the risk of a prolonged post op period to return to normal function re: shoegear and activity level as well relief of symptoms and surgical results will be less than expected.\par I had a lengthy discussion with the patient regarding immobilization via a cam walker. After reviewing the benefits as well as the risks of being placed in a cam walker the patient has agreed. Next a new cam walker was removed from its packaging and customized to fit the patient's foot and leg and was instructed on how to use the Cam Walker. They were advised that they need to stay in the cam walker at times except showering and sleeping. I advised the patient that they should not drive with a cam walker. The Cam Walker was placed on the appropriate limb, instructions on how to ambulate with the cam walker were reviewed and the patient showed a knowledge on how to walk, remove, and reapply the cam walker. Complete discharge instructions were given as well as a follow up appointment.\par follow up appt 1 week\par

## 2020-12-31 ENCOUNTER — APPOINTMENT (OUTPATIENT)
Dept: PODIATRY | Facility: CLINIC | Age: 65
End: 2020-12-31
Payer: COMMERCIAL

## 2020-12-31 VITALS — HEIGHT: 60.5 IN | BODY MASS INDEX: 25.24 KG/M2 | WEIGHT: 132 LBS

## 2020-12-31 DIAGNOSIS — G89.29 PAIN IN LEFT FOOT: ICD-10-CM

## 2020-12-31 DIAGNOSIS — M79.672 PAIN IN LEFT FOOT: ICD-10-CM

## 2020-12-31 PROCEDURE — 99024 POSTOP FOLLOW-UP VISIT: CPT

## 2020-12-31 PROCEDURE — 73630 X-RAY EXAM OF FOOT: CPT | Mod: LT

## 2020-12-31 RX ORDER — CEPHALEXIN 500 MG/1
500 CAPSULE ORAL 3 TIMES DAILY
Qty: 21 | Refills: 1 | Status: DISCONTINUED | COMMUNITY
Start: 2020-12-10 | End: 2020-12-31

## 2020-12-31 NOTE — PHYSICAL EXAM
[General Appearance - Alert] : alert [General Appearance - In No Acute Distress] : in no acute distress [Skin Color & Pigmentation] : normal skin color and pigmentation [Skin Turgor] : normal skin turgor [] : no rash [Skin Lesions] : no skin lesions [FreeTextEntry3] : Vascular exam reveals palpable pedal pulses, the foot is warm to touch, there was good capillary fill time, the skin is normal in appearance there is no evidence of vascular disease or compromise at this time [de-identified] : in CAM and walking independantly [Foot Ulcer] : no foot ulcer [Skin Induration] : no skin induration [FreeTextEntry1] :  no evidence of infection\par dressing CDI, dermabond in place, k-wire intact no sign of pin tract infection\par mild dehiscence and maceration to 2nd toe incision midway near the joint

## 2020-12-31 NOTE — REVIEW OF SYSTEMS
[Negative] : Integumentary [Chest Pain] : no chest pain [Shortness Of Breath] : no shortness of breath [FreeTextEntry9] : P

## 2020-12-31 NOTE — HISTORY OF PRESENT ILLNESS
[FreeTextEntry1] : patient is s/p left foot surgery. doing well and no complaints. denies fever, chills, N and V , calf pain and SOB\par Duration- DOS 12/9/2020 Tramaine/Akin/@nd ray toe and dislocation \par \par

## 2021-01-06 ENCOUNTER — APPOINTMENT (OUTPATIENT)
Dept: PODIATRY | Facility: CLINIC | Age: 66
End: 2021-01-06
Payer: COMMERCIAL

## 2021-01-06 VITALS — HEIGHT: 65 IN | BODY MASS INDEX: 21.99 KG/M2 | WEIGHT: 132 LBS

## 2021-01-06 PROCEDURE — 99024 POSTOP FOLLOW-UP VISIT: CPT

## 2021-01-06 NOTE — PHYSICAL EXAM
[General Appearance - Alert] : alert [General Appearance - In No Acute Distress] : in no acute distress [Skin Color & Pigmentation] : normal skin color and pigmentation [Skin Turgor] : normal skin turgor [] : no rash [Skin Lesions] : no skin lesions [FreeTextEntry3] : Vascular exam reveals palpable pedal pulses, the foot is warm to touch, there was good capillary fill time, the skin is normal in appearance there is no evidence of vascular disease or compromise at this time [de-identified] : in CAM and walking independantly [Foot Ulcer] : no foot ulcer [Skin Induration] : no skin induration [FreeTextEntry1] :  no evidence of infection\par dressing CDI, dermabond in place, k-wire intact no sign of pin tract infection\par 2nd toe healed with mild eschar

## 2021-01-06 NOTE — HISTORY OF PRESENT ILLNESS
[FreeTextEntry1] : patient is s/p left foot surgery. doing well and no complaints. denies fever, chills, N and V , calf pain and SOB Duration- DOS 12/9/2020 Tramaine/Akin/@nd ray toe and dislocation  D

## 2021-01-06 NOTE — REVIEW OF SYSTEMS
[Negative] : Constitutional [Chest Pain] : no chest pain [Leg Claudication] : no intermittent leg claudication [Lower Ext Edema] : no lower extremity edema [Shortness Of Breath] : no shortness of breath [Skin Lesions] : no skin lesions [Skin Wound] : no skin wound [FreeTextEntry9] : with sx shoe [de-identified] : h

## 2021-01-06 NOTE — PROCEDURE
[FreeTextEntry1] : Redress 2nd toe incision q2d w/ betadine\par A lengthy discussion with the patient at this time regarding their current diagnosis, surgical status and prognosis. I advised the patient that if they continue to follow my postoperative instructions regarding limited progressive weight bearing and activity level as well as continued formal physical therapy as well as physical therapy at home coupled with continued ice, elevation and offloading the surgical site they will continue to progress as expected.\par follow up appt 1 week\par

## 2021-01-14 ENCOUNTER — APPOINTMENT (OUTPATIENT)
Dept: PODIATRY | Facility: CLINIC | Age: 66
End: 2021-01-14
Payer: COMMERCIAL

## 2021-01-14 VITALS — HEIGHT: 65 IN | BODY MASS INDEX: 21.99 KG/M2 | WEIGHT: 132 LBS

## 2021-01-14 PROCEDURE — 73630 X-RAY EXAM OF FOOT: CPT | Mod: LT

## 2021-01-14 PROCEDURE — 99024 POSTOP FOLLOW-UP VISIT: CPT

## 2021-01-14 NOTE — REVIEW OF SYSTEMS
[Negative] : Constitutional [Chest Pain] : no chest pain [Leg Claudication] : no intermittent leg claudication [Lower Ext Edema] : no lower extremity edema [Shortness Of Breath] : no shortness of breath [Skin Lesions] : no skin lesions [Skin Wound] : no skin wound [FreeTextEntry9] : with sx shoe

## 2021-01-14 NOTE — HISTORY OF PRESENT ILLNESS
[FreeTextEntry1] : patient is s/p left foot surgery. doing well and no complaints. denies fever, chills, N and V , calf pain and SOB Duration- DOS 12/9/2020 Tramaine/Akin/ 2nd ray hammer toe and dislocation  \par

## 2021-01-14 NOTE — PROCEDURE
[FreeTextEntry1] : X-rays were taken in the office multiple views of the left foot\par One notes immediately k-wire bent and patient does admit to probably walking and pushing off distally, denies not using shoe, but i explained the sx shoe is used to prevent exactly this.\par osteotomies healed, hardware in good position with good reduction of deformity\par The patient was advised formal physical therapy is critical at this point and that I recommend it in order to try and achieve best possible outcome to their problem. Rx has been supplied.\par follow up appt 2 weeks\par

## 2021-01-14 NOTE — PHYSICAL EXAM
[General Appearance - Alert] : alert [General Appearance - In No Acute Distress] : in no acute distress [Skin Color & Pigmentation] : normal skin color and pigmentation [] : no rash [Skin Turgor] : normal skin turgor [Skin Lesions] : no skin lesions [FreeTextEntry3] : Vascular exam reveals palpable pedal pulses, the foot is warm to touch, there was good capillary fill time, the skin is normal in appearance there is no evidence of vascular disease or compromise at this time [de-identified] : in sx shoe, and w/o complpaints [Foot Ulcer] : no foot ulcer [Skin Induration] : no skin induration [FreeTextEntry1] :  no evidence of infection dermabond came off from toe and hav site, pin hole opening 2nd toe no cellulitis, no tunneling, no sinus tract, no drainage, no odor, mild periwound erythema, no evidence of infection\par , k-wire intact no sign of pin tract infection\par 2nd toe healed with mild eschar [Sensation] : the sensory exam was normal to light touch and pinprick [No Focal Deficits] : no focal deficits [Deep Tendon Reflexes (DTR)] : deep tendon reflexes were 2+ and symmetric [Motor Exam] : the motor exam was normal

## 2021-01-20 DIAGNOSIS — R39.9 UNSPECIFIED SYMPTOMS AND SIGNS INVOLVING THE GENITOURINARY SYSTEM: ICD-10-CM

## 2021-01-21 ENCOUNTER — LABORATORY RESULT (OUTPATIENT)
Age: 66
End: 2021-01-21

## 2021-01-26 ENCOUNTER — APPOINTMENT (OUTPATIENT)
Dept: FAMILY MEDICINE | Facility: CLINIC | Age: 66
End: 2021-01-26

## 2021-01-28 ENCOUNTER — APPOINTMENT (OUTPATIENT)
Dept: PODIATRY | Facility: CLINIC | Age: 66
End: 2021-01-28
Payer: COMMERCIAL

## 2021-01-28 VITALS — HEIGHT: 65 IN | BODY MASS INDEX: 21.99 KG/M2 | WEIGHT: 132 LBS

## 2021-01-28 DIAGNOSIS — S93.125S: ICD-10-CM

## 2021-01-28 DIAGNOSIS — M24.575 CONTRACTURE, LEFT FOOT: ICD-10-CM

## 2021-01-28 PROCEDURE — 99024 POSTOP FOLLOW-UP VISIT: CPT

## 2021-01-28 PROCEDURE — 73630 X-RAY EXAM OF FOOT: CPT | Mod: LT

## 2021-01-28 NOTE — PHYSICAL EXAM
[General Appearance - Alert] : alert [General Appearance - In No Acute Distress] : in no acute distress [Skin Color & Pigmentation] : normal skin color and pigmentation [Skin Turgor] : normal skin turgor [] : no rash [Skin Lesions] : no skin lesions [Sensation] : the sensory exam was normal to light touch and pinprick [No Focal Deficits] : no focal deficits [Deep Tendon Reflexes (DTR)] : deep tendon reflexes were 2+ and symmetric [Motor Exam] : the motor exam was normal [FreeTextEntry3] : Vascular exam reveals palpable pedal pulses, the foot is warm to touch, there was good capillary fill time, the skin is normal in appearance there is no evidence of vascular disease or compromise at this time [de-identified] : Evaluation of the range of motion to the surgical site appears to be approaching normal with no crepitus or pain nor any sign of recurrence of deformity. The great toe joint shows no jamming or restriction and no reproducible pain. Adised to continue at home PT as well as formal PT prescribed.\par  [Foot Ulcer] : no foot ulcer [Skin Induration] : no skin induration

## 2021-01-28 NOTE — REVIEW OF SYSTEMS
[Negative] : Constitutional [Chest Pain] : no chest pain [Leg Claudication] : no intermittent leg claudication [Lower Ext Edema] : no lower extremity edema [Shortness Of Breath] : no shortness of breath [Joint Swelling] : no joint swelling [Skin Lesions] : no skin lesions [Skin Wound] : no skin wound [FreeTextEntry9] : mild, 2nd PIP joint

## 2021-01-28 NOTE — HISTORY OF PRESENT ILLNESS
"Subjective:       Patient ID: Luz Mcfadden is a 47 y.o. female.      Chief Complaint: weight      HPI  Luz Mcfadden is a 47 y.o. female with no known PMH    Presents to follow up after 4 months regarding weight loss efforts.      Last visit BMI was 31, weight was 70.4 kg (155 lb 3.3 oz).  Today, BMI is 30.8, weight is 69.3 kg (152 lb 12.5 oz).   So she has last 3 pounds in 4 months.      She has been monitoring her weights.  States she hasn't done much to lose weight.  She hasn't tried changing her diet too much.  States her problem is the diet coke and breads.  States she eats vegetables but doesn't like to buy fruit.  She has tried weight watchers in the past and it has worked before.      She works in .      Review of Systems   Constitutional: Negative for chills, fever and malaise/fatigue.   HENT: Negative for hearing loss and sore throat.    Eyes: Negative for blurred vision, pain and discharge.   Respiratory: Negative for cough, shortness of breath and wheezing.    Cardiovascular: Negative for chest pain, palpitations and leg swelling.   Gastrointestinal: Negative for abdominal pain, blood in stool, constipation, diarrhea, nausea and vomiting.   Genitourinary: Negative for dysuria, frequency and hematuria.   Musculoskeletal: Negative for back pain, myalgias and neck pain.   Skin: Negative for itching and rash.   Neurological: Negative for dizziness, loss of consciousness, weakness and headaches.   Endo/Heme/Allergies: Negative for polydipsia.           Objective:     Vitals:    06/04/18 1308   BP: 114/76   Pulse: 74   Weight: 71.1 kg (156 lb 12 oz)   Height: 4' 11" (1.499 m)       Estimated body mass index is 31.66 kg/m² as calculated from the following:    Height as of this encounter: 4' 11" (1.499 m).    Weight as of this encounter: 71.1 kg (156 lb 12 oz).    Physical Exam   Constitutional: She is oriented to person, place, and time and well-developed, well-nourished, and in no distress. " [FreeTextEntry1] : patient is s/p left foot surgery. doing well and no complaints. denies fever, chills, N and V , calf pain and SOB Duration- DOS 12/9/2020 Tramaine/Akin/ 2nd ray hammer toe and dislocation  \par walking in sneakers and states "I feel great"\par    HENT:   Head: Normocephalic and atraumatic.   Eyes: Conjunctivae and EOM are normal. Pupils are equal, round, and reactive to light.   Neck: Neck supple. No tracheal deviation present. No thyromegaly present.   Cardiovascular: Normal rate, regular rhythm, normal heart sounds and intact distal pulses.    No murmur heard.  Pulmonary/Chest: Effort normal and breath sounds normal. She has no wheezes. She has no rales.   Abdominal: Soft. Bowel sounds are normal. She exhibits no distension. There is no tenderness.   Neurological: She is alert and oriented to person, place, and time. GCS score is 15.   Skin: Skin is warm and dry.         Assessment/Plan:     Obesity  --Discussed extensively the measures she can take regarding meal planning, regular exercise, healthy diet and snack options, food-to-go etc.  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       RTC 6 months for weight check    This case was discussed with Dr. Lo Araujo, PGY-2

## 2021-01-28 NOTE — PROCEDURE
[FreeTextEntry1] : X-rays were taken in the office multiple views of the left foot\par good reduction of all deformities and deviations\par the toe is remodeling as expected. Minimal swelling, no rotational deformity, good reduction of deformity, no pain\par Patient was advised to continue formal PT because he is feeling good relief from it.\par I have advised the patient that they may return to normal activity level but to limit it to tolerance. I advised them at this time that they do not need any assistive device special shoe bandaging or bracing. I also advised that should they be doing her normal daily function and they should experience some pain that upon finishing that they should return to anti-inflammatory medication over-the-counter if possible, ice and elevation. If this continues more than 24-48 hours he should contact the office immediately for followup appointment\par follow up appt 5 weeks\par \par

## 2021-03-23 ENCOUNTER — APPOINTMENT (OUTPATIENT)
Dept: PODIATRY | Facility: CLINIC | Age: 66
End: 2021-03-23
Payer: COMMERCIAL

## 2021-03-23 VITALS — HEIGHT: 61.5 IN | WEIGHT: 132 LBS | BODY MASS INDEX: 24.6 KG/M2

## 2021-03-23 VITALS — WEIGHT: 132 LBS | HEIGHT: 65 IN | BODY MASS INDEX: 21.99 KG/M2

## 2021-03-23 DIAGNOSIS — M25.80 OTHER SPECIFIED JOINT DISORDERS, UNSPECIFIED JOINT: ICD-10-CM

## 2021-03-23 DIAGNOSIS — M79.671 PAIN IN RIGHT FOOT: ICD-10-CM

## 2021-03-23 DIAGNOSIS — G89.29 PAIN IN RIGHT FOOT: ICD-10-CM

## 2021-03-23 PROCEDURE — 73630 X-RAY EXAM OF FOOT: CPT | Mod: LT

## 2021-03-23 PROCEDURE — 99214 OFFICE O/P EST MOD 30 MIN: CPT | Mod: 25

## 2021-03-23 PROCEDURE — 99072 ADDL SUPL MATRL&STAF TM PHE: CPT

## 2021-03-23 NOTE — REVIEW OF SYSTEMS
[As Noted in HPI] : as noted in HPI [Negative] : Integumentary [Leg Claudication] : no intermittent leg claudication [Lower Ext Edema] : no lower extremity edema [Joint Swelling] : no joint swelling [Joint Stiffness] : no joint stiffness [Limb Pain] : no limb pain [Limb Swelling] : no limb swelling [Skin Lesions] : no skin lesions [Skin Wound] : no skin wound [Limb Weakness] : no limb weakness [Difficulty Walking] : no difficulty walking [Muscle Weakness] : no muscle weakness

## 2021-03-23 NOTE — PROCEDURE
[FreeTextEntry1] : X-rays were taken in the office multiple views of the left foot\par Findings showed good ongoing correction of deformity of the bunion deformity of the great toe and second toe. Surgically implanted hardware was in good position and it does not appear to be the hardware.\par X-rays were taken in the office multiple views right foot\par Joint spaces are well maintained there is a moderate to severe bunion deformity noted\par I have placed him in a special pad beneath the skin of the left under the sesamoid complex to see if this helps her condition. If this does have advised her to return with her orthotics which she was told she should be delayed and limited no permanent pad. \par Regarding the right foot I have applied offloading and cushioned Silipos bunion shields to help alleviate the bending she is experiencing in the bunion.\par All diagnostic test, labs, imaging, prior notes and reports (both new and recent) reviewed prior to seeing the patient and discussed at length face to face with the patient. How these results pertain to the patient, their diagnosis and treatments also reviewed. All questions asked and answered appropriately. The risks and complications of not following my recommendations d/w the patient.\par greater than 30 minutes spent with patient\par \par

## 2021-03-23 NOTE — PHYSICAL EXAM
[General Appearance - Alert] : alert [General Appearance - In No Acute Distress] : in no acute distress [Pes Planus] : pes planus deformity [Skin Color & Pigmentation] : normal skin color and pigmentation [Skin Turgor] : normal skin turgor [] : no rash [Skin Lesions] : no skin lesions [FreeTextEntry3] : Vascular exam reveals palpable pedal pulses, the foot is warm to touch, there was good capillary fill time, the skin is normal in appearance there is no evidence of vascular disease or compromise at this time [de-identified] : The sx's include mild reproducible pain upon palpation of the sesamoid complex to the left foot. Given this point in the postop period I consider this to be a nl residual postoperative sequelae. Regarding the right foot shows classic bunion deformity pain with redness irritation and burning to the medial eminence\par Evaluation of the range of motion to the surgical site appears to be approaching normal (> 45 degrees) with no crepitus or pain nor any sign of recurrence of deformity. The great toe joint shows no jamming or restriction and no reproducible pain. Adised to continue at home PT\par  [Foot Ulcer] : no foot ulcer [Skin Induration] : no skin induration

## 2021-03-23 NOTE — HISTORY OF PRESENT ILLNESS
[FreeTextEntry1] : Location-right HAV, plantar 1st met head\par Duration-right about 3 months, left residual post op pain sub 1\par Past tx-shoe modification, tylenol, \par

## 2021-04-22 NOTE — ASSESSMENT
A (Cath Expo Angio 6f Multipak) catheter was inserted. jl4   [FreeTextEntry1] : Leucopenia\par Neutropeniia +/- lymphopenia\par At least since 3/2018\par PARTHA positive 1:320\par PARTHA cascade suggestive of early CTD\par B12, folate, copper, tsh- normal\par ESR, CRP,  ANCA,  Hepatitis, HIV, myeloma panel, [facility] flow cytometry- negative\par \par SHe is seen today for follow up\par Feels good overall\par Labs reviewed and discussed\par SHe saw Dr Jordan \par Monitor counts for now\par If counts decline- will consider bone marrow\par \par Iron deficiency\par She had colonoscopy with Dr Oliveira in 2018\par No fatigue or tiredness\par Discussed again about PO vs IV iron\par She wants to hold off for now\par Advised to follow up with Dr Oliveira\par \par Follow up in 3-4 months\par CBC, CMP, ferritin\par

## 2021-04-29 ENCOUNTER — APPOINTMENT (OUTPATIENT)
Dept: PODIATRY | Facility: CLINIC | Age: 66
End: 2021-04-29
Payer: COMMERCIAL

## 2021-04-29 VITALS — BODY MASS INDEX: 24.6 KG/M2 | HEIGHT: 61.5 IN | WEIGHT: 132 LBS

## 2021-04-29 DIAGNOSIS — M20.11 HALLUX VALGUS (ACQUIRED), RIGHT FOOT: ICD-10-CM

## 2021-04-29 DIAGNOSIS — M20.42 OTHER HAMMER TOE(S) (ACQUIRED), LEFT FOOT: ICD-10-CM

## 2021-04-29 DIAGNOSIS — M20.12 HALLUX VALGUS (ACQUIRED), LEFT FOOT: ICD-10-CM

## 2021-04-29 PROCEDURE — 99072 ADDL SUPL MATRL&STAF TM PHE: CPT

## 2021-04-29 PROCEDURE — 99213 OFFICE O/P EST LOW 20 MIN: CPT

## 2021-04-29 NOTE — PROCEDURE
[FreeTextEntry1] : weight bearing x-rays ordered\par Based on my physical examination and my clinical findings and the patient's description of the symptoms, a complete differential diagnosis was reviewed with the patient. Possible diagnoses as well as treatment options explained in great detail. All questions asked and answered appropriately.\par right foot\par the patient has a moderate-to-severe hallux valgus deformity, with an inflamed and erythematous medial eminence, decreased range of motion dorsiflexion and plantar flexion, pain upon palpation of the area with tracking noted. The patient admits to pain in all types of shoe gear both "dress" and casual, and has noticed increase in both pain and deformity. There's decreased range of motion only approximately 30-45° before they complain of increased pain. They also have decreased plantar flexion and does admit to pain along the extensor tendon\par left foot\par Dancres pad applied\par A complete and thorough evaluation of the type of shoes they should be wearing and type of shoes for this time of year was discussed with patient.\par \par If xrays are negative wiill consider steroid injection\par A lengthy discussion with the patient regarding risks and benefits of steroid injection. I explained to the patient that in the natural progression of orthopedic type problems oral anti-inflammatories and injectable anti-inflammatory medication or an integral part of the treatment process. I did explain to the patient some of the risks associated with steroid injection included but were not limited to infection at the puncture site, discoloration of skin, the condition being no better, and most importantly the risk of soft tissue injury. I did explain to the patient, in certain rare occasions, there is soft tissue damage including but not limited to atrophy of the soft tissue, necrosis of the tissue, rupture of the ligament, and or possibly rupture of the tendon.\par After weighing the risks alternatives and benefits to the injection the patient decided to hold off for now and attempt other options first. All questions asked and answered appropriately.\par

## 2021-04-29 NOTE — REVIEW OF SYSTEMS
[As Noted in HPI] : as noted in HPI [Leg Claudication] : no intermittent leg claudication [Lower Ext Edema] : no lower extremity edema [Limb Weakness] : no limb weakness [Difficulty Walking] : no difficulty walking [Muscle Weakness] : no muscle weakness [Negative] : Heme/Lymph

## 2021-04-29 NOTE — PHYSICAL EXAM
[General Appearance - Alert] : alert [General Appearance - In No Acute Distress] : in no acute distress [Pes Planus] : pes planus deformity [Skin Color & Pigmentation] : normal skin color and pigmentation [Skin Turgor] : normal skin turgor [] : no rash [Skin Lesions] : no skin lesions [FreeTextEntry3] : Vascular exam reveals palpable pedal pulses, the foot is warm to touch, there was good capillary fill time, the skin is normal in appearance there is no evidence of vascular disease or compromise at this time [de-identified] : The sx's include mild reproducible pain upon palpation of the sesamoid complex to the left foot specifically the medial sesamoid. right foot bunion deformity pain\par Evaluation of the range of motion to the surgical site appears to be approaching normal (> 45 degrees) with no crepitus or pain nor any sign of recurrence of deformity. The great toe joint shows no jamming or restriction and no reproducible pain. Adised to continue at home PT\par  [Foot Ulcer] : no foot ulcer [Skin Induration] : no skin induration [FreeTextEntry1] : Dermatologic exam reveals no significant findings. No sign of subcutaneous nodules skin lesions or ulcers. Webspaces are clear there are no sign of infection cellulitis or erythema.\par

## 2021-04-29 NOTE — HISTORY OF PRESENT ILLNESS
[FreeTextEntry1] : Location-right HAV, ongoing, but improved pain plantar 1st met head left\par s/p jude, akin, and 2nd toe left foot\par

## 2021-05-03 ENCOUNTER — RESULT REVIEW (OUTPATIENT)
Age: 66
End: 2021-05-03

## 2021-05-14 ENCOUNTER — RESULT REVIEW (OUTPATIENT)
Age: 66
End: 2021-05-14

## 2021-05-14 ENCOUNTER — APPOINTMENT (OUTPATIENT)
Dept: HEMATOLOGY ONCOLOGY | Facility: CLINIC | Age: 66
End: 2021-05-14
Payer: COMMERCIAL

## 2021-05-14 VITALS
OXYGEN SATURATION: 98 % | RESPIRATION RATE: 18 BRPM | BODY MASS INDEX: 24.98 KG/M2 | DIASTOLIC BLOOD PRESSURE: 70 MMHG | HEIGHT: 61.5 IN | TEMPERATURE: 97.2 F | SYSTOLIC BLOOD PRESSURE: 112 MMHG | HEART RATE: 69 BPM | WEIGHT: 134 LBS

## 2021-05-14 DIAGNOSIS — R53.83 OTHER FATIGUE: ICD-10-CM

## 2021-05-14 DIAGNOSIS — D70.9 NEUTROPENIA, UNSPECIFIED: ICD-10-CM

## 2021-05-14 PROCEDURE — 99072 ADDL SUPL MATRL&STAF TM PHE: CPT

## 2021-05-14 PROCEDURE — 99213 OFFICE O/P EST LOW 20 MIN: CPT

## 2021-05-14 RX ORDER — OXYCODONE AND ACETAMINOPHEN 5; 325 MG/1; MG/1
5-325 TABLET ORAL
Qty: 30 | Refills: 0 | Status: DISCONTINUED | COMMUNITY
Start: 2020-12-10 | End: 2021-05-14

## 2021-05-14 NOTE — PHYSICAL EXAM
[Fully active, able to carry on all pre-disease performance without restriction] : Status 0 - Fully active, able to carry on all pre-disease performance without restriction [Normal] : affect appropriate [de-identified] : swollen metacarpophalengeal joints

## 2021-05-14 NOTE — HISTORY OF PRESENT ILLNESS
[de-identified] : Ms. Radha Gates is very pleasant 64 year old female here for further evaluation for chronic neutropenia.\par \par Patient with history of arthritis and positive PARTHA who has intermittent neutropenia in the last two years with latest labs on 1/8/2020 showed wbc 2.8 hgb 12.1 hct 35.6 MCV - 94.4 Plts 178 AnC 1.2  Lymph # 1.2 She also had Ferritin of 40.4 on 3/28/2018 but never on supplement.\par  \par Patient with  maternal aunt with breast at age 40s and other aunt at 88.   \par No fevers chills night sweats\par No fatigue, tiredness, apathy\par No appetite loss or weight loss/weight gain\par No chest pain, shortness of breath, palpitation, dizziness\par No abdominal pain, nausea, vomiting, diarrhea, constipation\par No bright red blood per rectum, hematemesis or melena\par No hematuria, dysuria, frequency, urgency\par No headaches, visual changes, focal weakness, tingling, numbness\par \par \par   [de-identified] : Patient is seen today for leukopenia and low iron reports of doing well, had bunionectomyn and hammer toe on left foot in December 2020 and did well. 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 \par \par

## 2021-05-14 NOTE — ASSESSMENT
[FreeTextEntry1] : Leucopenia\par Neutropeniia +/- lymphopenia\par At least since 3/2018\par PARTHA positive 1:320\par PARTHA cascade suggestive of early CTD\par B12, folate, copper, tsh- normal\par ESR, CRP,  ANCA,  Hepatitis, HIV, myeloma panel, [facility] flow cytometry- negative\par \par SHe is seen today for follow up\par Feels good overall\par Labs reviewed and discussed\par SHe saw Dr Jordan \par Counts have been pretty much stable - continue to monitor. \par She'll consider bone marrow once counts drastically dropped. \par \par #Iron deficiency\par She had colonoscopy with Dr Oliveira in 2018\par Ferritin around 30s - no PO or IV iron \par overall feeling well\par repeat Ferritin  - will f/u with results\par \par d/w Dr. Lyn \par Follow up in 6 months\par CBC, CMP, ferritin\par

## 2021-05-14 NOTE — RESULTS/DATA
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

## 2021-05-14 NOTE — CONSULT LETTER
[Dear  ___] : Dear  [unfilled], [Courtesy Letter:] : I had the pleasure of seeing your patient, [unfilled], in my office today. [Please see my note below.] : Please see my note below. [Consult Closing:] : Thank you very much for allowing me to participate in the care of this patient.  If you have any questions, please do not hesitate to contact me. [Sincerely,] : Sincerely, [FreeTextEntry3] : Osito Lyn MD, MPH\par Attending Physician\par Hematology Oncology\par [facility] [place]\par [facility]\par

## 2021-05-20 ENCOUNTER — APPOINTMENT (OUTPATIENT)
Dept: PODIATRY | Facility: CLINIC | Age: 66
End: 2021-05-20
Payer: COMMERCIAL

## 2021-05-20 PROCEDURE — L8499: CPT

## 2021-06-14 ENCOUNTER — APPOINTMENT (OUTPATIENT)
Dept: OBGYN | Facility: CLINIC | Age: 66
End: 2021-06-14
Payer: COMMERCIAL

## 2021-06-14 ENCOUNTER — NON-APPOINTMENT (OUTPATIENT)
Age: 66
End: 2021-06-14

## 2021-06-14 VITALS
HEIGHT: 61.5 IN | BODY MASS INDEX: 24.6 KG/M2 | SYSTOLIC BLOOD PRESSURE: 114 MMHG | WEIGHT: 132 LBS | DIASTOLIC BLOOD PRESSURE: 66 MMHG

## 2021-06-14 DIAGNOSIS — Z01.419 ENCOUNTER FOR GYNECOLOGICAL EXAMINATION (GENERAL) (ROUTINE) W/OUT ABNORMAL FINDINGS: ICD-10-CM

## 2021-06-14 PROCEDURE — 99397 PER PM REEVAL EST PAT 65+ YR: CPT

## 2021-06-14 PROCEDURE — 99072 ADDL SUPL MATRL&STAF TM PHE: CPT

## 2021-06-14 PROCEDURE — L8499: CPT

## 2021-06-14 NOTE — HISTORY OF PRESENT ILLNESS
[FreeTextEntry1] : 67yo  postmenopausal without systemic HRT here for Gyn exam. Pt with ASCUS/Negative HPV Pap  but Pap  AND  Neg/HPV Neg. Hoping to retire next year. She and her wife thinking about move to [place] area. [Mammogramdate] : 10/15/20 [TextBox_19] : BIRADS 2D [BreastSonogramDate] : 10/15/20 [TextBox_25] : BIRADS 2D [PapSmeardate] : 11/19/18 [TextBox_31] : Neg/HPV Neg [BoneDensityDate] : 9/4/20 [TextBox_37] : T Score Spine -0.9 Hip -1.5 Fem Neck -1.9 [PGxTotal] : 1 [PGHxFullTerm] : 0 [PGHxPremature] : 0 [Abrazo West CampusxLiving] : 0 [PGHxABInduced] : 1

## 2021-06-15 LAB — HPV HIGH+LOW RISK DNA PNL CVX: NOT DETECTED

## 2021-06-16 LAB — CYTOLOGY CVX/VAG DOC THIN PREP: ABNORMAL

## 2021-08-12 ENCOUNTER — RESULT REVIEW (OUTPATIENT)
Age: 66
End: 2021-08-12

## 2021-08-12 ENCOUNTER — LABORATORY RESULT (OUTPATIENT)
Age: 66
End: 2021-08-12

## 2021-09-03 ENCOUNTER — RESULT REVIEW (OUTPATIENT)
Age: 66
End: 2021-09-03

## 2021-10-18 ENCOUNTER — RESULT REVIEW (OUTPATIENT)
Age: 66
End: 2021-10-18

## 2021-10-29 ENCOUNTER — RESULT REVIEW (OUTPATIENT)
Age: 66
End: 2021-10-29

## 2021-11-11 ENCOUNTER — NON-APPOINTMENT (OUTPATIENT)
Age: 66
End: 2021-11-11

## 2021-11-11 ENCOUNTER — APPOINTMENT (OUTPATIENT)
Dept: BREAST CENTER | Facility: CLINIC | Age: 66
End: 2021-11-11
Payer: COMMERCIAL

## 2021-11-11 PROCEDURE — 99213 OFFICE O/P EST LOW 20 MIN: CPT

## 2021-11-11 NOTE — HISTORY OF PRESENT ILLNESS
[FreeTextEntry1] : S/P R Br Bx w/NL (5/16/14): No resid papliloma\par S/P R Sono Core Bx (4/2/14): +papillary lesion\par +FH Br Ca (M. Aunt x 2 40's (BRCA-), M. FC 40's (BRCA-))\par TC Risk: 21.4%\par Got Pfizer booster (10/21)(KACIEE)\par No MH/FH changes. ROS reviewed/discussed. Taking Vit D. Vit D level (3/17): 55. Last Bone Densitometry (2016): +osteopenia\par Mammo/Sono (10/18/21): HD, NSF

## 2021-11-12 ENCOUNTER — RESULT REVIEW (OUTPATIENT)
Age: 66
End: 2021-11-12

## 2021-11-12 ENCOUNTER — APPOINTMENT (OUTPATIENT)
Dept: HEMATOLOGY ONCOLOGY | Facility: CLINIC | Age: 66
End: 2021-11-12

## 2021-11-12 VITALS
TEMPERATURE: 97.8 F | DIASTOLIC BLOOD PRESSURE: 64 MMHG | HEART RATE: 62 BPM | BODY MASS INDEX: 25.72 KG/M2 | SYSTOLIC BLOOD PRESSURE: 97 MMHG | OXYGEN SATURATION: 98 % | HEIGHT: 61.5 IN | WEIGHT: 138 LBS | RESPIRATION RATE: 18 BRPM

## 2021-12-09 ENCOUNTER — RESULT REVIEW (OUTPATIENT)
Age: 66
End: 2021-12-09

## 2021-12-23 ENCOUNTER — RESULT REVIEW (OUTPATIENT)
Age: 66
End: 2021-12-23

## 2022-01-19 RX ORDER — BUPROPION HYDROCHLORIDE 150 MG/1
150 TABLET, EXTENDED RELEASE ORAL
Qty: 90 | Refills: 1 | Status: DISCONTINUED | COMMUNITY
End: 2022-01-19

## 2022-01-31 ENCOUNTER — APPOINTMENT (OUTPATIENT)
Dept: INTERNAL MEDICINE | Facility: CLINIC | Age: 67
End: 2022-01-31
Payer: COMMERCIAL

## 2022-01-31 VITALS — BODY MASS INDEX: 25.49 KG/M2 | TEMPERATURE: 98 F | HEIGHT: 61 IN | WEIGHT: 135 LBS

## 2022-01-31 VITALS — SYSTOLIC BLOOD PRESSURE: 105 MMHG | DIASTOLIC BLOOD PRESSURE: 60 MMHG

## 2022-01-31 PROCEDURE — 36415 COLL VENOUS BLD VENIPUNCTURE: CPT

## 2022-01-31 PROCEDURE — 99397 PER PM REEVAL EST PAT 65+ YR: CPT | Mod: 25

## 2022-01-31 RX ORDER — PNEUMOCOCCAL 13-VALENT CONJUGATE VACCINE 2.2; 2.2; 2.2; 2.2; 2.2; 4.4; 2.2; 2.2; 2.2; 2.2; 2.2; 2.2; 2.2 UG/.5ML; UG/.5ML; UG/.5ML; UG/.5ML; UG/.5ML; UG/.5ML; UG/.5ML; UG/.5ML; UG/.5ML; UG/.5ML; UG/.5ML; UG/.5ML; UG/.5ML
INJECTION, SUSPENSION INTRAMUSCULAR
Qty: 1 | Refills: 0 | Status: COMPLETED | COMMUNITY
Start: 2020-11-16 | End: 2022-01-31

## 2022-01-31 NOTE — HISTORY OF PRESENT ILLNESS
[FreeTextEntry1] : physical [de-identified] : Patient is a 66F with osteoporosis, arthritis, depression and anxiety, leukopenia, + PARTHA presents today for annual exam\par Arthritis worsening (hands, knees) trying to exercise through it. Very active with cardio and resistance exercises\par \par Psychiatrist no longer in network. Seeing therapist oop\par more depression, than anxiety on buproprion and celexa\par \par \par

## 2022-01-31 NOTE — HEALTH RISK ASSESSMENT
[Very Good] : ~his/her~  mood as very good [Never] : Never [Yes] : Yes [Monthly or less (1 pt)] : Monthly or less (1 point) [1 or 2 (0 pts)] : 1 or 2 (0 points) [Never (0 pts)] : Never (0 points) [No] : In the past 12 months have you used drugs other than those required for medical reasons? No [No falls in past year] : Patient reported no falls in the past year [0] : 2) Feeling down, depressed, or hopeless: Not at all (0) [HIV test declined] : HIV test declined [Hepatitis C test declined] : Hepatitis C test declined [None] : None [With Significant Other] : lives with significant other [# of Members in Household ___] :  household currently consist of [unfilled] member(s) [Employed] : employed [College] : College [] :  [# Of Children ___] : has [unfilled] children [Sexually Active] : sexually active [Feels Safe at Home] : Feels safe at home [Fully functional (bathing, dressing, toileting, transferring, walking, feeding)] : Fully functional (bathing, dressing, toileting, transferring, walking, feeding) [Fully functional (using the telephone, shopping, preparing meals, housekeeping, doing laundry, using] : Fully functional and needs no help or supervision to perform IADLs (using the telephone, shopping, preparing meals, housekeeping, doing laundry, using transportation, managing medications and managing finances) [Reports changes in vision] : Reports changes in vision [Smoke Detector] : smoke detector [Carbon Monoxide Detector] : carbon monoxide detector [Safety elements used in home] : safety elements used in home [Seat Belt] :  uses seat belt [Sunscreen] : uses sunscreen [Travel to Developing Areas] : travel to developing areas [Patient/Caregiver not ready to engage] : , patient/caregiver not ready to engage [Patient reported mammogram was normal] : Patient reported mammogram was normal [Patient reported PAP Smear was normal] : Patient reported PAP Smear was normal [Patient reported bone density results were abnormal] : Patient reported bone density results were abnormal [PHQ-2 Negative - No further assessment needed] : PHQ-2 Negative - No further assessment needed [Patient reported colonoscopy was normal] : Patient reported colonoscopy was normal [de-identified] : socially  [Audit-CScore] : 1 [de-identified] : Gym and hiking pickle ball on weekends [de-identified] : Gluten free and low carbs  [SIH4Ioatj] : 0 [Change in mental status noted] : No change in mental status noted [Language] : denies difficulty with language [Behavior] : denies difficulty with behavior [Learning/Retaining New Information] : denies difficulty learning/retaining new information [Handling Complex Tasks] : denies difficulty handling complex tasks [Reasoning] : denies difficulty with reasoning [Spatial Ability and Orientation] : denies difficulty with spatial ability and orientation [Reports changes in hearing] : Reports no changes in hearing [Reports normal functional visual acuity (ie: able to read med bottle)] : Reports poor functional visual acuity.  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

## 2022-02-02 ENCOUNTER — TRANSCRIPTION ENCOUNTER (OUTPATIENT)
Age: 67
End: 2022-02-02

## 2022-02-02 LAB
25(OH)D3 SERPL-MCNC: 80.5 NG/ML
CHOLEST SERPL-MCNC: 220 MG/DL
ESTIMATED AVERAGE GLUCOSE: 108 MG/DL
HBA1C MFR BLD HPLC: 5.4 %
HDLC SERPL-MCNC: 92 MG/DL
LDLC SERPL CALC-MCNC: 116 MG/DL
NONHDLC SERPL-MCNC: 128 MG/DL
TRIGL SERPL-MCNC: 61 MG/DL
TSH SERPL-ACNC: 2.66 UIU/ML

## 2022-04-06 ENCOUNTER — APPOINTMENT (OUTPATIENT)
Dept: INTERNAL MEDICINE | Facility: CLINIC | Age: 67
End: 2022-04-06
Payer: COMMERCIAL

## 2022-04-06 VITALS — TEMPERATURE: 98 F | BODY MASS INDEX: 25.3 KG/M2 | WEIGHT: 134 LBS | HEIGHT: 61 IN

## 2022-04-06 PROCEDURE — 99213 OFFICE O/P EST LOW 20 MIN: CPT

## 2022-04-06 NOTE — HEALTH RISK ASSESSMENT
[Never] : Never [Yes] : Yes [Monthly or less (1 pt)] : Monthly or less (1 point) [1 or 2 (0 pts)] : 1 or 2 (0 points) [Never (0 pts)] : Never (0 points) [No] : In the past 12 months have you used drugs other than those required for medical reasons? No [No falls in past year] : Patient reported no falls in the past year [0] : 1) Little interest or pleasure doing things: Not at all (0) [PHQ-2 Negative - No further assessment needed] : PHQ-2 Negative - No further assessment needed [1] : 2) Feeling down, depressed, or hopeless for several days (1) [Patient/Caregiver not ready to engage] : , patient/caregiver not ready to engage [de-identified] : socially  [Audit-CScore] : 1 [de-identified] : Gym and hiking pickle ball on weekends [de-identified] : Gluten free and low carbs  [[id number]] : 1 [AdvancecareDate] : 4/2022

## 2022-04-06 NOTE — HISTORY OF PRESENT ILLNESS
[de-identified] : Patient is a 67F with osteoporosis, arthritis, depression and anxiety, leukopenia, + PARTHA presents today for increased stress, anxiety, and depression\par Follows with therapist, no longer with psychiatrist. \par For a long time now has been having more depression and anxiety (currently on buproprion and celexa). More "short fused"\par Finding is had to get through the day and having more "dark" thoughts\par Feels all of this is building up\par \par \par

## 2022-04-14 ENCOUNTER — TRANSCRIPTION ENCOUNTER (OUTPATIENT)
Age: 67
End: 2022-04-14

## 2022-04-19 ENCOUNTER — TRANSCRIPTION ENCOUNTER (OUTPATIENT)
Age: 67
End: 2022-04-19

## 2022-04-20 ENCOUNTER — TRANSCRIPTION ENCOUNTER (OUTPATIENT)
Age: 67
End: 2022-04-20

## 2022-05-20 ENCOUNTER — APPOINTMENT (OUTPATIENT)
Dept: HEMATOLOGY ONCOLOGY | Facility: CLINIC | Age: 67
End: 2022-05-20
Payer: COMMERCIAL

## 2022-05-20 ENCOUNTER — RESULT REVIEW (OUTPATIENT)
Age: 67
End: 2022-05-20

## 2022-05-20 VITALS
WEIGHT: 141 LBS | HEIGHT: 61 IN | BODY MASS INDEX: 26.62 KG/M2 | TEMPERATURE: 99 F | OXYGEN SATURATION: 99 % | RESPIRATION RATE: 18 BRPM | DIASTOLIC BLOOD PRESSURE: 56 MMHG | SYSTOLIC BLOOD PRESSURE: 101 MMHG | HEART RATE: 68 BPM

## 2022-05-20 PROCEDURE — 36415 COLL VENOUS BLD VENIPUNCTURE: CPT

## 2022-05-20 PROCEDURE — 99214 OFFICE O/P EST MOD 30 MIN: CPT | Mod: 25

## 2022-05-23 NOTE — REVIEW OF SYSTEMS
[Constipation] : no constipation [FreeTextEntry2] : 10 point review of systems negative except as outlined in HPI

## 2022-05-23 NOTE — CONSULT LETTER
[FreeTextEntry3] : Osiot Lyn MD, MPH\par Attending Physician\par Hematology Oncology\par [facility] [place]\par [facility]\par

## 2022-05-23 NOTE — RESULTS/DATA
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

## 2022-05-23 NOTE — ASSESSMENT
[FreeTextEntry1] : Leucopenia\par Neutropeniia +/- lymphopenia\par At least since 3/2018\par PARTHA positive 1:320\par PARTHA cascade suggestive of early CTD\par B12, folate, copper, tsh- normal\par ESR, CRP,  ANCA,  Hepatitis, HIV, myeloma panel, [facility] flow cytometry- negative\par SHe is seen today for follow up\par Feels good overall\par Labs drawn in the office, reviewed, analyzed and discussed\par Recent labs show normal ANC and WBC\par Monitor for now\par \par #Iron deficiency\par She had colonoscopy with Dr Oliveira in 2018\par Ferritin around 30s - no PO or IV iron \par overall feeling well\par repeat Ferritin  23\par Advised to follow up with Dr Oliveira\par \par Follow up in 12 months\par CBC, CMP, ferritin\par

## 2022-05-23 NOTE — HISTORY OF PRESENT ILLNESS
[de-identified] : Ms. Radha Gates is very pleasant 64 year old female here for further evaluation for chronic neutropenia.\par \par Patient with history of arthritis and positive PARTHA who has intermittent neutropenia in the last two years with latest labs on 1/8/2020 showed wbc 2.8 hgb 12.1 hct 35.6 MCV - 94.4 Plts 178 AnC 1.2  Lymph # 1.2 She also had Ferritin of 40.4 on 3/28/2018 but never on supplement.\par  \par Patient with  maternal aunt with breast at age 40s and other aunt at 88.   \par No fevers chills night sweats\par No fatigue, tiredness, apathy\par No appetite loss or weight loss/weight gain\par No chest pain, shortness of breath, palpitation, dizziness\par No abdominal pain, nausea, vomiting, diarrhea, constipation\par No bright red blood per rectum, hematemesis or melena\par No hematuria, dysuria, frequency, urgency\par No headaches, visual changes, focal weakness, tingling, numbness\par \par \par   [de-identified] : Patient is seen today for follow up\par \par Feels good overall\par \par No new symptoms

## 2022-06-03 ENCOUNTER — TRANSCRIPTION ENCOUNTER (OUTPATIENT)
Age: 67
End: 2022-06-03

## 2022-06-08 ENCOUNTER — TRANSCRIPTION ENCOUNTER (OUTPATIENT)
Age: 67
End: 2022-06-08

## 2022-06-10 ENCOUNTER — TRANSCRIPTION ENCOUNTER (OUTPATIENT)
Age: 67
End: 2022-06-10

## 2022-06-13 ENCOUNTER — TRANSCRIPTION ENCOUNTER (OUTPATIENT)
Age: 67
End: 2022-06-13

## 2022-06-17 ENCOUNTER — RESULT REVIEW (OUTPATIENT)
Age: 67
End: 2022-06-17

## 2022-06-19 ENCOUNTER — RESULT REVIEW (OUTPATIENT)
Age: 67
End: 2022-06-19

## 2022-06-22 ENCOUNTER — TRANSCRIPTION ENCOUNTER (OUTPATIENT)
Age: 67
End: 2022-06-22

## 2022-06-29 ENCOUNTER — APPOINTMENT (OUTPATIENT)
Dept: NEUROLOGY | Facility: CLINIC | Age: 67
End: 2022-06-29
Payer: COMMERCIAL

## 2022-06-29 VITALS
HEART RATE: 63 BPM | HEIGHT: 61 IN | SYSTOLIC BLOOD PRESSURE: 102 MMHG | WEIGHT: 135 LBS | DIASTOLIC BLOOD PRESSURE: 64 MMHG | BODY MASS INDEX: 25.49 KG/M2

## 2022-06-29 DIAGNOSIS — Z91.81 HISTORY OF FALLING: ICD-10-CM

## 2022-06-29 PROCEDURE — 99214 OFFICE O/P EST MOD 30 MIN: CPT

## 2022-06-30 ENCOUNTER — NON-APPOINTMENT (OUTPATIENT)
Age: 67
End: 2022-06-30

## 2022-06-30 DIAGNOSIS — K64.8 OTHER HEMORRHOIDS: ICD-10-CM

## 2022-06-30 DIAGNOSIS — K59.09 OTHER CONSTIPATION: ICD-10-CM

## 2022-06-30 DIAGNOSIS — K29.50 UNSPECIFIED CHRONIC GASTRITIS W/OUT BLEEDING: ICD-10-CM

## 2022-06-30 DIAGNOSIS — Z12.11 ENCOUNTER FOR SCREENING FOR MALIGNANT NEOPLASM OF COLON: ICD-10-CM

## 2022-06-30 DIAGNOSIS — K21.9 GASTRO-ESOPHAGEAL REFLUX DISEASE W/OUT ESOPHAGITIS: ICD-10-CM

## 2022-06-30 NOTE — PHYSICAL EXAM
[General Appearance - Alert] : alert [Sclera] : the sclera and conjunctiva were normal [Neck Appearance] : the appearance of the neck was normal [] : no respiratory distress [Auscultation Breath Sounds / Voice Sounds] : lungs were clear to auscultation bilaterally [Heart Rate And Rhythm] : heart rate was normal and rhythm regular [Heart Sounds] : normal S1 and S2 [Heart Sounds Gallop] : no gallops [Murmurs] : no murmurs [Heart Sounds Pericardial Friction Rub] : no pericardial rub [Edema] : there was no peripheral edema [Flat] : flat [Normal] : normal [Soft, Nontender] : the abdomen was soft and nontender [No Mass] : no masses were palpated [No HSM] : no hepatosplenomegaly noted [No CVA Tenderness] : no ~M costovertebral angle tenderness [Abnormal Walk] : normal gait [Skin Color & Pigmentation] : normal skin color and pigmentation [Oriented To Time, Place, And Person] : oriented to person, place, and time

## 2022-07-07 ENCOUNTER — TRANSCRIPTION ENCOUNTER (OUTPATIENT)
Age: 67
End: 2022-07-07

## 2022-07-26 ENCOUNTER — NON-APPOINTMENT (OUTPATIENT)
Age: 67
End: 2022-07-26

## 2022-08-23 NOTE — REVIEW OF SYSTEMS
[As Noted in HPI] : as noted in HPI [Negative] : Respiratory [Red Eyes] : eyes not red [Skin Lesions] : no skin lesions [Confused] : no confusion [Proptosis] : no proptosis [Easy Bruising] : no tendency for easy bruising

## 2022-08-23 NOTE — HISTORY OF PRESENT ILLNESS
[de-identified] : \par \par This HPI  reflects a summary and review of records : including previous and most recent  Labs, body imaging, consults and progress notes, operative and pathology reports, EKG reports, ED records, found in [facility], [facility],  [facility] and any additional records brought in by  the patient at the time of the visit.\par \par \par PCP: Jean\par \par 66 yo F w h/o OA, Osteopenia, Fibrocystic Breasts, Anxiety/  Depression\par Redundant colon, Constipation,Hemorrhoids\par GERD, Gastritis\par \par 6/30/22    Today:  Feeling well, no c/o , CP, SOB/ FARRIS, Cough, Wheeze, Palpitations, edema\par   Most recent labs   reviewed w patient: cmet, tsh, [id number]--nzm;  ,LDL--116, HDL=92  ,Vit. D-80\par   Hgb=11.9\par \par   Today: recently w incr GERD, ht burn, nausea\par               No abd pain, melena, early satiety, anorexia, wt loss \par               No NSAIDS\par \par * Abd pain-->no\par * Nausea--> yes\par * Vomit--> no\par * Early satiety--> no\par * Belching--> yes\par * Hiccups--> no\par * Regurgitation--> no\par * Acid Taste / Water Brash--> no\par * Ht burn--> occas \par * Dysphagia--> no\par * Throat Clearing--> no\par * Hoarseness--> no\par * Post-Nasal Drip--> no\par * Congestion--> no\par * Globus--> no\par * Cough--> no\par * Wheeze / PC-> -no\par * BMs: # 4 qd\par * Constipation--> no\par * Diarrhea--> no\par * Bloating--> no\par * Strain on Defecation--> no\par * Incompl Evac--> no\par * Flatulence--> no\par * Gurgling--> no\par * Melena--> no\par * BPBPR-> -no\par * Anorexia--> no\par * Wt. Loss--> no\par \par \par \par \par

## 2022-08-23 NOTE — ASSESSMENT
[FreeTextEntry1] : \par \par \par 1. Constipation :   well - controlled.  No pain,  constipation,  diarrhea,  bloat\par     Probably CIC vs Essential vs Functional Bowel D/O \par Recommend: \par * Diet: moderate -High Fiber,  Low Fat & Lactose free, Low FODMAPs--was emphasized\par * Daily fluid intake was reviewed : 6  --  8 cups of decaffeinated fluid daily was emphasized\par * Regular aerobic exercise was emphasized\par * Probiotics  1  daily\par * Miralax   presently required\par * Neuromodulation: on Celexa 30mg & Well butrin  \par \par \par \par \par \par 2. Hemorrhoids:  well - controlled.  No pain,  swelling,  itch,  bleeding\par * Discussed   the  potential complications of thrombosis,  pain,  infection,  swelling, itching,  bleeding --none recently\par Recommendations: \par * Moderate- High  Fiber Diet was   emphasized\par * 6  --  8 cups of decaffeinated fluid daily was emphasized \par * Sitz Bathes as needed ,  No:  Anusol HC  Suppos / Cream  MS BID -- was needed\par * No:  Tucks BID,  Balneol Lotion,   Calmoseptine Oint -- was needed ;    can use  Prep H prn\par * No:  need for  Colorectal surgical evaluation for possible ablation \par \par \par \par \par \par 3. GERD:  recently w incr ht burn, No  dysphagia,  throat clear\par * No LPR,  Suarez’  w/o Dysplasia,  or h/o  Esophagitis grade: A--  was found \par \par Recommend: \par * Anti-reflux diet & life-style changes reviewed & re-emphasized.  \par * HOB elevation /  Bedge use emphasized\par * Weight reduction & regular exercise emphasized\par \par * ++ PPI use : Prilosec OTC        --  as needed was emphasized\par No need for  H2B q HS:  \par No need for Carafate  1 gram \par I previously reviewed & summarized the prospective randomized & retrospective non-randomized studies\par looking at potential long term SE's of PPIs, w special attention to associations & actual cause\par as related to GI infections, bone loss, cognitive changes, KD, Covid, vitamin & electrolyte deficiencies\par questions were answered, pt advised that PPIs should be used when needed as indicated by their\par clinical indication and response and tapering off is always the goal if possible. pt understood.\par \par * No  need for pH Monitor,  Manometry, or  Esophagram \par * No  need for ENT  eval/F/U, \par * No  need for  Surgical  eval  \par \par *  for  EGD: --for Barretts screening / surveillance needed \par \par \par \par \par \par \par \par 4. Gastritis: may be active w some  N, No  V, early satiety, pain\par * No H.Pylori,   IM,    Bile;    No NSAID  or  ETOH exposure-- was found\par Recommended and reviewed: \par * Avoid NSAIDs / ETOH--have been shown to exacerbate and possible lead to cx's & GIBs\par * No Prevpac  or Pylera, is needed \par * No PPI  or  Carafate 1 gm QID is needed\par * ++   F/U EGD  to r/o PUD \par \par \par \par \par \par \par 5. Colorectal   Neoplasia  Screening:  to be evaluated\par   Utilizing teaching posters and anatomical models the following were discussed and emphasized with the patient in detail: \par * Discussed the pre-malignant potential of polyps\par * Discussed the importance of f/u surveillance / screening colonoscopy \par * moderate-High  Fiber Diet was emphasized\par * Anti-oxidants and ASA/NSAID Therapy emphasized\par * Given age > 40-51 yo\par * Recommend Colonoscopy  to  R/O  Colonic Neoplasia-- in 2022\par \par \par \par \par \par \par Informed Consent:\par * The risks & Benefits of EGD/   Colonoscopy were discussed w patient.\par * This included but was not limited to perforation, bleeding, sedation /med rxns possibly requiring surgery, blood transfusions, antibiotics & CPR/Intubation.\par * Pt. understands & agrees to the procedures.\par The following instructions in regards to the prep and medically essential ( cardiac, pulmonary, sz, psych, endocrine)  pre-op medication administration\par was reviewed and emphasized with the patient . \par * Pt. advised to D/C  ASA/NSAIDs  7  Days  PTP.\par * [ +++ ]  Dulcolax / Miralax / Mag. Citrate ,  [     ] Prepopik/ Clenpiq ,  [     ] Osmo Prep,  [    ] GoLytely,  prep. reviewed w Pt.\par * Hold  [           ] AM of procedure.\par * Hold  [           ] PM  before procedure.\par * Take  [           ] PM  before procedure.\par * Take  [           ] AM of procedure.\par \par \par \par \par

## 2022-08-25 ENCOUNTER — RESULT REVIEW (OUTPATIENT)
Age: 67
End: 2022-08-25

## 2022-08-26 ENCOUNTER — APPOINTMENT (OUTPATIENT)
Dept: GASTROENTEROLOGY | Facility: HOSPITAL | Age: 67
End: 2022-08-26

## 2022-09-08 ENCOUNTER — RESULT REVIEW (OUTPATIENT)
Age: 67
End: 2022-09-08

## 2022-09-13 ENCOUNTER — RX RENEWAL (OUTPATIENT)
Age: 67
End: 2022-09-13

## 2022-09-21 ENCOUNTER — TRANSCRIPTION ENCOUNTER (OUTPATIENT)
Age: 67
End: 2022-09-21

## 2022-10-18 ENCOUNTER — RESULT REVIEW (OUTPATIENT)
Age: 67
End: 2022-10-18

## 2022-12-09 ENCOUNTER — TRANSCRIPTION ENCOUNTER (OUTPATIENT)
Age: 67
End: 2022-12-09

## 2022-12-15 ENCOUNTER — TRANSCRIPTION ENCOUNTER (OUTPATIENT)
Age: 67
End: 2022-12-15

## 2022-12-16 ENCOUNTER — TRANSCRIPTION ENCOUNTER (OUTPATIENT)
Age: 67
End: 2022-12-16

## 2022-12-29 ENCOUNTER — APPOINTMENT (OUTPATIENT)
Dept: BREAST CENTER | Facility: CLINIC | Age: 67
End: 2022-12-29

## 2022-12-29 VITALS
DIASTOLIC BLOOD PRESSURE: 64 MMHG | SYSTOLIC BLOOD PRESSURE: 106 MMHG | HEIGHT: 60.5 IN | BODY MASS INDEX: 25.82 KG/M2 | WEIGHT: 135 LBS | HEART RATE: 69 BPM

## 2022-12-29 DIAGNOSIS — M85.80 OTHER SPECIFIED DISORDERS OF BONE DENSITY AND STRUCTURE, UNSPECIFIED SITE: ICD-10-CM

## 2022-12-29 PROCEDURE — 99214 OFFICE O/P EST MOD 30 MIN: CPT

## 2022-12-29 NOTE — PHYSICAL EXAM
[Normocephalic] : normocephalic [Atraumatic] : atraumatic [Supple] : supple [No Supraclavicular Adenopathy] : no supraclavicular adenopathy [No Thyromegaly] : no thyromegaly [Normal Sinus Rhythm] : normal sinus rhythm [Examined in the supine and seated position] : examined in the supine and seated position [No dominant masses] : no dominant masses in right breast  [No dominant masses] : no dominant masses left breast [No Nipple Retraction] : no left nipple retraction [No Nipple Discharge] : no left nipple discharge [No Axillary Lymphadenopathy] : no left axillary lymphadenopathy [No Edema] : no edema [No Rashes] : no rashes [No Ulceration] : no ulceration [de-identified] : +2mm R inf Cx LN > observe [de-identified] : +dense FC tissue\par NSF  [de-identified] : +dense FC tissue\par NSF

## 2022-12-29 NOTE — HISTORY OF PRESENT ILLNESS
[FreeTextEntry1] : S/P R Br Bx w/NL (5/16/14): No resid papliloma\par S/P R Sono Core Bx (4/2/14): +papillary lesion\par +FH Br Ca (M. Aunt x 2 40's (BRCA-), M. FC 40's (BRCA-))\par TC Risk: 21.4%\par BRCA ([employer])(3/20): -, +JOSE/RECQL4 VUS > discussed\par +c/o change in taste/smell > NOT COVID\par Got Pfizer bivalent (10/22)\par No MH/FH changes. ROS reviewed/discussed. Taking Vit D. Vit D level (3/17): 55. Last Bone Densitometry (2016): +osteopenia\par Mammo/Sono (10/18/22): HD, NSF

## 2023-01-30 ENCOUNTER — TRANSCRIPTION ENCOUNTER (OUTPATIENT)
Age: 68
End: 2023-01-30

## 2023-02-02 ENCOUNTER — RX RENEWAL (OUTPATIENT)
Age: 68
End: 2023-02-02

## 2023-02-03 ENCOUNTER — RX RENEWAL (OUTPATIENT)
Age: 68
End: 2023-02-03

## 2023-02-03 NOTE — ASSESSMENT
[FreeTextEntry1] : Radha Gates is a 67 year old woman with probable concussion on 6/19.\par \par Recommend alternating rest and activity periods for active recovery.\par Recommend waiting one month until she returns to higher impact sports to prevent any reinjury.\par Tylenol PRN if headache reoccurs.\par CT scan reviewed with no significant findings. Her neurological exam is normal. No need for any further imaging.\par \par Possible carpal tunnel syndrome- recommend wearing hand splints at night.\par Discussed EMG to confirm diagnosis. She prefers to wait on EMG at this time.\par \par Follow up PRN or if abnormal movements return. \par \par

## 2023-02-03 NOTE — DATA REVIEWED
[de-identified] : 6/19/22 CT head\par IMPRESSION: No evidence of intracranial hemorrhage, mass effect or acute territorial infarction.  \par    \par \par 6/26/20 CT head see report

## 2023-02-03 NOTE — PHYSICAL EXAM
[FreeTextEntry1] : Physical examination \par General: No acute distress, Awake, Alert.   \par  \par Mental status \par Awake, alert, and oriented to person, time and place, Normal attention span and concentration, Recent and remote memory intact, Language intact, Fund of knowledge intact.   \par Delayed recall 3/3 \par \par Cranial Nerves \par II: VFF  \par III, IV, VI: PERRL, EOMI.   \par V: Facial sensation is normal B/L.   \par VII: Facial strength is normal B/L. \par \par \par VIII: Gross hearing is intact.    \par \par IX, X: Palate is midline and elevates symmetrically.   \par XI: Trapezius normal strength.   \par XII: Tongue midline without atrophy or fasciculations. \par \par Motor exam  \par Muscle tone - no evidence of rigidity or resistance in all 4 extremities.  \par No atrophy or fasciculations \par Muscle Strength: arms and legs, proximal and distal flexors and extensors are normal \par ? minimal APB weakness.\par \par No UE drift.\par \par Reflexes  \par \par Diffuse hyporeflexia\par  \par \par Plantars right: mute.   \par Plantars left: mute.   \par  \par \par Coordination \par Finger to nose: Normal.  \par Heel to shin: Normal.   \par  \par \par Sensory \par Intact sensation to vibration and PP.\par Decreased cold up to wrists B.\par Positive Tinel's left hand\par Negative Phalen's. \par \par Gait \par Normal including heels, toes, and tandem gait.  \par \par Negative Romberg\par

## 2023-02-03 NOTE — HISTORY OF PRESENT ILLNESS
[FreeTextEntry1] : Radha Gates is a right handed 67 year old woman with a history of depression, anxiety, and previous subdural hematoma in 2020, presenting for a follow up after a fall on the court while playing Pickleball on 6/19.\par \par She endorses she fell forward on 6/19 and struck her head and broke her glasses. She also had abrasions on her knees from the fall. She had four stitches placed and a negative CT head done. Denies loss of consciousness. Noted numbness on the right side of the head in area of injury. \par Denies visual changes.\par \par She reports feeling "out of it" afterward and had a persistent frontal headache for four days with nausea. Denies dizziness, light or sound sensitivity. Ms. Gates notes difficulties with sleeping the first night after her injury.  She notes fatigue 6/19 through 6/21. She went to work on 6/20 the following day and had to stay home on Tuesday. Ms. Gates is currently back at work full time without any memory or cognitive issues. She is able to function at her job without issue. Denies memory loss or seizure activity. Denies any emotional disturbances. She sees a therapist and no longer has a psychiatrist. \par  \par Previously seen by Dr. Matute for abnormal movements. She is no longer having those.\par \par Current medications: \par Celexa 30mg\par Wellbutrin 150mg\par Sees a functional medicine specialist -b complex, b6, zinc, amino acids, liquid magnesium, see medication list for remaining supplements.\par \par The remaining neurological review of systems is negative.

## 2023-02-06 ENCOUNTER — TRANSCRIPTION ENCOUNTER (OUTPATIENT)
Age: 68
End: 2023-02-06

## 2023-02-06 ENCOUNTER — APPOINTMENT (OUTPATIENT)
Dept: NEUROLOGY | Facility: CLINIC | Age: 68
End: 2023-02-06
Payer: COMMERCIAL

## 2023-02-06 DIAGNOSIS — R25.1 TREMOR, UNSPECIFIED: ICD-10-CM

## 2023-02-06 PROCEDURE — 99212 OFFICE O/P EST SF 10 MIN: CPT | Mod: 95

## 2023-02-06 RX ORDER — ACETYLCHOLINE BROMIDE
CRYSTALS MISCELLANEOUS
Refills: 0 | Status: ACTIVE | COMMUNITY

## 2023-02-06 NOTE — ASSESSMENT
[FreeTextEntry1] : Radha Gates is a 67 year old woman with a history of subdural hematoma, concussion on 6/19, presenting for new onset right hand tremor.\par \par Right hand dystonia- MRI Brain w/wo to rule out any structural lesions. \par Referral to Dr. Fernández Movement disorder specialist for evaluation.  \par \par Possible carpal tunnel syndrome- recommend wearing hand splints at night.\par Discussed EMG to confirm diagnosis previous visit. She prefers to wait on EMG at this time.\par \par Follow up with movement disorder specialist. \par \par

## 2023-02-06 NOTE — HISTORY OF PRESENT ILLNESS
[Home] : at home, [unfilled] , at the time of the visit. [Medical Office: ([facility])___] : at the medical office located in  [Verbal consent obtained from patient] : the patient, [unfilled] [FreeTextEntry1] : Radha Gates is a 67 year old right handed woman with a history of depression, anxiety, previous subdural hematoma in 2020 and concussion in summer 2022, presenting for a follow up appointment for new onset tremors. \par \par She notes for a few months she has had consistent right hand intermittent tremor only when writing. Does not note a tremor that interferes with her writing or when eating. Occasionally, she will note a tremor when putting her make up on. Denies balance difficulties or falls. No aggravating or relieving factors except tremor stops when she stops writing. \par Ms. Gates saw Dr. Matute in the past for abnormal movements but it involved her entire arm. Now it is specifically her hand when writing. \par \par She plays Pickleball and is physically active.\par Her father had a tremor but that was from an injury. \par \par Denies bradykinesia. She notes years ago she lost her taste and smell. \par Denies history of seizures, or staring. On Wellbutrin 300 mg daily but plans on decreasing it. \par \par No longer on Omeprazole. Taking Natrexone 4.5 mg (anti inflammatory) with functional medicine doctor. \par \par The remaining neurological review of systems is negative. \par \par 6/29/22 \par Radha Gates is a right handed 67 year old woman with a history of depression, anxiety, and previous subdural hematoma in 2020, presenting for a follow up after a fall on the court while playing Pickleball on 6/19.\par \par She endorses she fell forward on 6/19 and struck her head and broke her glasses. She also had abrasions on her knees from the fall. She had four stitches placed and a negative CT head done. Denies loss of consciousness. Noted numbness on the right side of the head in area of injury. \par Denies visual changes.\par \par She reports feeling "out of it" afterward and had a persistent frontal headache for four days with nausea. Denies dizziness, light or sound sensitivity. Ms. Gates notes difficulties with sleeping the first night after her injury.  She notes fatigue 6/19 through 6/21. She went to work on 6/20 the following day and had to stay home on Tuesday. Ms. Gates is currently back at work full time without any memory or cognitive issues. She is able to function at her job without issue. Denies memory loss or seizure activity. Denies any emotional disturbances. She sees a therapist and no longer has a psychiatrist. \par  \par Previously seen by Dr. Matute for abnormal movements. She is no longer having those.\par \par Current medications: \par Celexa 30mg\par Wellbutrin 150mg\par Sees a functional medicine specialist -b complex, b6, zinc, amino acids, liquid magnesium, see medication list for remaining supplements.\par \par The remaining neurological review of systems is negative.

## 2023-02-06 NOTE — DATA REVIEWED
[de-identified] : 6/19/22 CT head\par IMPRESSION: No evidence of intracranial hemorrhage, mass effect or acute territorial infarction.  \par    \par \par 6/26/20 CT head see report

## 2023-02-06 NOTE — PHYSICAL EXAM
[FreeTextEntry1] : televist lost visual capabilities during visit-physical exam limited \par \par Physical examination \par General: No acute distress, Awake, Alert.   \par  \par Mental status \par Awake, alert, gives detailed history. \par \par Cranial Nerves \par  \par VII: Facial strength is normal B/L. \par \par \par VIII: Gross hearing is intact.    \par \par  \par  \par  \par  \par \par \par 6/2022 in person exam \par Physical examination \par General: No acute distress, Awake, Alert.   \par  \par Mental status \par Awake, alert, and oriented to person, time and place, Normal attention span and concentration, Recent and remote memory intact, Language intact, Fund of knowledge intact.   \par Delayed recall 3/3 \par \par Cranial Nerves \par II: VFF  \par III, IV, VI: PERRL, EOMI.   \par V: Facial sensation is normal B/L.   \par VII: Facial strength is normal B/L. \par \par \par VIII: Gross hearing is intact.    \par \par IX, X: Palate is midline and elevates symmetrically.   \par XI: Trapezius normal strength.   \par XII: Tongue midline without atrophy or fasciculations. \par \par Motor exam  \par Muscle tone - no evidence of rigidity or resistance in all 4 extremities.  \par No atrophy or fasciculations \par Muscle Strength: arms and legs, proximal and distal flexors and extensors are normal \par ? minimal APB weakness.\par \par No UE drift.\par \par Reflexes  \par \par Diffuse hyporeflexia\par  \par \par Plantars right: mute.   \par Plantars left: mute.   \par  \par \par Coordination \par Finger to nose: Normal.  \par Heel to shin: Normal.   \par  \par \par Sensory \par Intact sensation to vibration and PP.\par Decreased cold up to wrists B.\par Positive Tinel's left hand\par Negative Phalen's. \par \par Gait \par Normal including heels, toes, and tandem gait.  \par \par Negative Romberg\par

## 2023-02-07 ENCOUNTER — TRANSCRIPTION ENCOUNTER (OUTPATIENT)
Age: 68
End: 2023-02-07

## 2023-02-13 ENCOUNTER — TRANSCRIPTION ENCOUNTER (OUTPATIENT)
Age: 68
End: 2023-02-13

## 2023-02-21 ENCOUNTER — TRANSCRIPTION ENCOUNTER (OUTPATIENT)
Age: 68
End: 2023-02-21

## 2023-02-25 ENCOUNTER — TRANSCRIPTION ENCOUNTER (OUTPATIENT)
Age: 68
End: 2023-02-25

## 2023-03-06 ENCOUNTER — RX RENEWAL (OUTPATIENT)
Age: 68
End: 2023-03-06

## 2023-03-10 ENCOUNTER — APPOINTMENT (OUTPATIENT)
Dept: INTERNAL MEDICINE | Facility: CLINIC | Age: 68
End: 2023-03-10
Payer: COMMERCIAL

## 2023-03-10 VITALS
OXYGEN SATURATION: 98 % | WEIGHT: 137 LBS | SYSTOLIC BLOOD PRESSURE: 114 MMHG | HEART RATE: 62 BPM | DIASTOLIC BLOOD PRESSURE: 70 MMHG | HEIGHT: 60.5 IN | BODY MASS INDEX: 26.2 KG/M2

## 2023-03-10 DIAGNOSIS — F41.9 ANXIETY DISORDER, UNSPECIFIED: ICD-10-CM

## 2023-03-10 DIAGNOSIS — F32.A ANXIETY DISORDER, UNSPECIFIED: ICD-10-CM

## 2023-03-10 PROCEDURE — 99213 OFFICE O/P EST LOW 20 MIN: CPT

## 2023-03-11 PROBLEM — F41.9 ANXIETY AND DEPRESSION: Status: ACTIVE | Noted: 2020-11-14

## 2023-03-11 NOTE — HEALTH RISK ASSESSMENT
[Yes] : Yes [Monthly or less (1 pt)] : Monthly or less (1 point) [1 or 2 (0 pts)] : 1 or 2 (0 points) [Never (0 pts)] : Never (0 points) [No] : In the past 12 months have you used drugs other than those required for medical reasons? No [No falls in past year] : Patient reported no falls in the past year [0] : 1) Little interest or pleasure doing things: Not at all (0) [1] : 2) Feeling down, depressed, or hopeless for several days (1) [PHQ-2 Negative - No further assessment needed] : PHQ-2 Negative - No further assessment needed [Patient/Caregiver not ready to engage] : , patient/caregiver not ready to engage [Never] : Never [de-identified] : socially  [de-identified] : Gym and hiking pickle ball on weekends [Audit-CScore] : 1 [de-identified] : Gluten free and low carbs  [[id number]] : 1 [AdvancecareDate] : 4/2022

## 2023-03-11 NOTE — HISTORY OF PRESENT ILLNESS
[de-identified] : Patient is a 67F with osteoporosis, arthritis, depression and anxiety, leukopenia, + PARTHA presents today in follow up for stress, anxiety, and depression\par Recently returned from vacation 2 weeks ago, went back to work. Stressors continue but feels anxiety has been ok despite the decrease in celexa\par Would like to continue to try to decrease meds if possible\par

## 2023-03-15 ENCOUNTER — TRANSCRIPTION ENCOUNTER (OUTPATIENT)
Age: 68
End: 2023-03-15

## 2023-03-15 RX ORDER — BUPROPION HYDROCHLORIDE 150 MG/1
150 TABLET, EXTENDED RELEASE ORAL DAILY
Qty: 135 | Refills: 0 | Status: DISCONTINUED | COMMUNITY
Start: 2023-03-10 | End: 2023-03-15

## 2023-03-25 ENCOUNTER — RESULT REVIEW (OUTPATIENT)
Age: 68
End: 2023-03-25

## 2023-03-27 ENCOUNTER — TRANSCRIPTION ENCOUNTER (OUTPATIENT)
Age: 68
End: 2023-03-27

## 2023-05-12 ENCOUNTER — RESULT REVIEW (OUTPATIENT)
Age: 68
End: 2023-05-12

## 2023-05-12 ENCOUNTER — APPOINTMENT (OUTPATIENT)
Dept: HEMATOLOGY ONCOLOGY | Facility: CLINIC | Age: 68
End: 2023-05-12

## 2023-05-12 VITALS
HEIGHT: 65 IN | WEIGHT: 141 LBS | RESPIRATION RATE: 17 BRPM | OXYGEN SATURATION: 96 % | DIASTOLIC BLOOD PRESSURE: 58 MMHG | HEART RATE: 56 BPM | SYSTOLIC BLOOD PRESSURE: 102 MMHG | BODY MASS INDEX: 23.49 KG/M2 | TEMPERATURE: 96.6 F

## 2023-05-12 DIAGNOSIS — D72.819 DECREASED WHITE BLOOD CELL COUNT, UNSPECIFIED: ICD-10-CM

## 2023-05-18 ENCOUNTER — NON-APPOINTMENT (OUTPATIENT)
Age: 68
End: 2023-05-18

## 2023-05-19 ENCOUNTER — APPOINTMENT (OUTPATIENT)
Dept: HEMATOLOGY ONCOLOGY | Facility: CLINIC | Age: 68
End: 2023-05-19
Payer: COMMERCIAL

## 2023-05-19 VITALS
HEIGHT: 65 IN | OXYGEN SATURATION: 97 % | BODY MASS INDEX: 23.36 KG/M2 | TEMPERATURE: 96.7 F | DIASTOLIC BLOOD PRESSURE: 58 MMHG | SYSTOLIC BLOOD PRESSURE: 98 MMHG | WEIGHT: 140.19 LBS | HEART RATE: 100 BPM | RESPIRATION RATE: 18 BRPM

## 2023-05-19 VITALS
RESPIRATION RATE: 18 BRPM | DIASTOLIC BLOOD PRESSURE: 58 MMHG | BODY MASS INDEX: 23.36 KG/M2 | OXYGEN SATURATION: 97 % | TEMPERATURE: 96.7 F | WEIGHT: 140.19 LBS | HEIGHT: 65 IN | HEART RATE: 100 BPM | SYSTOLIC BLOOD PRESSURE: 98 MMHG

## 2023-05-19 DIAGNOSIS — E78.2 MIXED HYPERLIPIDEMIA: ICD-10-CM

## 2023-05-19 DIAGNOSIS — E61.1 IRON DEFICIENCY: ICD-10-CM

## 2023-05-19 PROCEDURE — 99214 OFFICE O/P EST MOD 30 MIN: CPT | Mod: 25

## 2023-05-19 NOTE — CONSULT LETTER
[FreeTextEntry3] : Osito Lyn MD, MPH\par Attending Physician\par Hematology Oncology\par [facility]\par [facility]\par

## 2023-05-19 NOTE — RESULTS/DATA
[FreeTextEntry1] : Labs drawn in the office, reviewed, analyzed and discussed\par \par PARTHA cascade ([facility])- Early connective tissue disease\par

## 2023-05-19 NOTE — ASSESSMENT
[FreeTextEntry1] : Leucopenia\par Neutropeniia +/- lymphopenia\par At least since 3/2018\par PARTHA positive 1:320\par PARTHA cascade suggestive of early CTD\par B12, folate, copper, tsh- normal\par ESR, CRP,  ANCA,  Hepatitis, HIV, myeloma panel, [facility] flow cytometry- negative\par SHe is seen today for follow up\par Feels good overall\par Labs drawn in the office, reviewed, analyzed and discussed\par Recent labs show normal ANC and WBC\par Advised to follow up PRN\par She saw Dr Maryanne Martinez who requested certain labs such as mercury levels, PARHTA etc which were requested. She will discuss findings with Dr Martinez\par \par #Iron deficiency\par She had colonoscopy with Dr Oliveira in 2018\par Ferritin around 30s - no PO or IV iron \par overall feeling well\par repeat Ferritin  23\par Advised to follow up with Dr Oliveira\par \par Advised patient to monitor labs with the care team including PCP, GI etc. Parameters including symptoms, signs and labs to monitor discussed at length. Advised to follow up PRN, Contact information given. Patient agrees with complete understanding.\par

## 2023-05-25 ENCOUNTER — RESULT REVIEW (OUTPATIENT)
Age: 68
End: 2023-05-25

## 2023-05-31 ENCOUNTER — APPOINTMENT (OUTPATIENT)
Dept: NEUROLOGY | Facility: CLINIC | Age: 68
End: 2023-05-31
Payer: COMMERCIAL

## 2023-05-31 DIAGNOSIS — R25.9 UNSPECIFIED ABNORMAL INVOLUNTARY MOVEMENTS: ICD-10-CM

## 2023-05-31 PROCEDURE — 99214 OFFICE O/P EST MOD 30 MIN: CPT

## 2023-05-31 RX ORDER — OMEPRAZOLE 40 MG/1
40 CAPSULE, DELAYED RELEASE ORAL
Qty: 90 | Refills: 3 | Status: DISCONTINUED | COMMUNITY
Start: 2022-08-26 | End: 2023-05-31

## 2023-06-13 ENCOUNTER — TRANSCRIPTION ENCOUNTER (OUTPATIENT)
Age: 68
End: 2023-06-13

## 2023-06-14 ENCOUNTER — RESULT REVIEW (OUTPATIENT)
Age: 68
End: 2023-06-14

## 2023-06-23 PROBLEM — R25.9 ABNORMAL INVOLUNTARY MOVEMENTS: Status: ACTIVE | Noted: 2020-05-06

## 2023-06-23 NOTE — HISTORY OF PRESENT ILLNESS
[FreeTextEntry1] : Radha Gates is a 68 year old F with a PMHx of Depression. She presents for initial evaluation in the Movement Disorders Clinic at [facility]. \par \par Had involuntary movement of the right hand and then was found to have a subdural hematoma. Repeat scanning showed resolution.\par \par Several months ago she is having an involuntary movement when she is writing. The movement takes over when she is writing. She states it is a jerky movement. MRI brain with and without contrast was negative for acute pathology on 3/25/23. \par She had a fall when she was playing pickle ball a year ago and hit her head, she misstepped. She had stitches, CT head was negative for acute bleed.\par \par No other activities that this interferes with. \par She takes antidepressants, Celexa 20mg daily and Bupropion 150mg XL daily\par She takes naltrexone for inflammation prescribed by functional medicine specialist.\par \par Family history: Myasthenia gravis in her mother, father had stroke\par Social history: she is still working, and exercises regularly.\par

## 2023-06-23 NOTE — PHYSICAL EXAM
[Person] : oriented to person [Place] : oriented to place [Time] : oriented to time [Concentration Intact] : normal concentrating ability [Comprehension] : comprehension intact [Cranial Nerves Optic (II)] : visual acuity intact bilaterally,  visual fields full to confrontation, pupils equal round and reactive to light [Cranial Nerves Oculomotor (III)] : extraocular motion intact [Cranial Nerves Trigeminal (V)] : facial sensation intact symmetrically [Cranial Nerves Facial (VII)] : face symmetrical [Cranial Nerves Vestibulocochlear (VIII)] : hearing was intact bilaterally [Cranial Nerves Glossopharyngeal (IX)] : tongue and palate midline [Cranial Nerves Accessory (XI - Cranial And Spinal)] : head turning and shoulder shrug symmetric [Cranial Nerves Hypoglossal (XII)] : there was no tongue deviation with protrusion [Motor Strength] : muscle strength was normal in all four extremities [Involuntary Movements] : no involuntary movements were seen [Paresis Pronator Drift Right-Sided] : no pronator drift on the right [Paresis Pronator Drift Left-Sided] : no pronator drift on the left [Sensation Tactile Decrease] : light touch was intact [Coordination - Dysmetria Impaired Finger-to-Nose Bilateral] : not present [1+] : Ankle jerk left 1+ [FreeTextEntry1] : No bradykinesia, rigidity, or tremors.\par Handwriting and spiral drawing were normal

## 2023-06-23 NOTE — DISCUSSION/SUMMARY
[FreeTextEntry1] : Radha Gates is a 68 year old F with a PMHx of Depression. She presents for initial evaluation in the Movement Disorders Clinic at [facility]. \par \par The patient's neurological examination today was unremarkable. Her history, however, is concerning for possible writer's cramp. At this time, her movements are not significantly interfering with her activities and we will continue to monitor her symptoms.\par \par RTC 6 months\par \par All questions were answered to her satisfaction. I spent 45 minutes on this encounter.

## 2023-06-28 ENCOUNTER — APPOINTMENT (OUTPATIENT)
Dept: INTERNAL MEDICINE | Facility: CLINIC | Age: 68
End: 2023-06-28
Payer: COMMERCIAL

## 2023-06-28 VITALS
WEIGHT: 138 LBS | HEART RATE: 68 BPM | OXYGEN SATURATION: 97 % | BODY MASS INDEX: 22.99 KG/M2 | HEIGHT: 65 IN | DIASTOLIC BLOOD PRESSURE: 70 MMHG | SYSTOLIC BLOOD PRESSURE: 100 MMHG

## 2023-06-28 DIAGNOSIS — Z00.00 ENCOUNTER FOR GENERAL ADULT MEDICAL EXAMINATION W/OUT ABNORMAL FINDINGS: ICD-10-CM

## 2023-06-28 PROCEDURE — 99397 PER PM REEVAL EST PAT 65+ YR: CPT

## 2023-06-28 RX ORDER — MULTIVITAMIN
TABLET ORAL
Refills: 0 | Status: COMPLETED | COMMUNITY
End: 2023-06-28

## 2023-06-28 RX ORDER — NALTREXONE HCL 4.5 MG
4.5 CAPSULE ORAL
Refills: 0 | Status: ACTIVE | COMMUNITY

## 2023-06-28 RX ORDER — SODIUM SULFATE, POTASSIUM SULFATE, MAGNESIUM SULFATE 17.5; 3.13; 1.6 G/ML; G/ML; G/ML
17.5-3.13-1.6 SOLUTION, CONCENTRATE ORAL
Qty: 2 | Refills: 0 | Status: COMPLETED | COMMUNITY
Start: 2022-06-29 | End: 2023-06-28

## 2023-06-28 NOTE — HISTORY OF PRESENT ILLNESS
[de-identified] : Patient is a 68F with osteoporosis, arthritis, depression and anxiety, leukopenia, + PARTHA presents today for annual exam\par Feeling well\par Doing well on current medications\par \par \par \par \par

## 2023-06-28 NOTE — HEALTH RISK ASSESSMENT
[Yes] : Yes [Monthly or less (1 pt)] : Monthly or less (1 point) [1 or 2 (0 pts)] : 1 or 2 (0 points) [Never (0 pts)] : Never (0 points) [No] : In the past 12 months have you used drugs other than those required for medical reasons? No [No falls in past year] : Patient reported no falls in the past year [0] : 1) Little interest or pleasure doing things: Not at all (0) [1] : 2) Feeling down, depressed, or hopeless for several days (1) [PHQ-2 Negative - No further assessment needed] : PHQ-2 Negative - No further assessment needed [Patient reported mammogram was normal] : Patient reported mammogram was normal [Patient reported PAP Smear was normal] : Patient reported PAP Smear was normal [HIV test declined] : HIV test declined [Hepatitis C test declined] : Hepatitis C test declined [None] : None [With Significant Other] : lives with significant other [Employed] : employed [College] : College [] :  [Sexually Active] : sexually active [Feels Safe at Home] : Feels safe at home [Fully functional (bathing, dressing, toileting, transferring, walking, feeding)] : Fully functional (bathing, dressing, toileting, transferring, walking, feeding) [Fully functional (using the telephone, shopping, preparing meals, housekeeping, doing laundry, using] : Fully functional and needs no help or supervision to perform IADLs (using the telephone, shopping, preparing meals, housekeeping, doing laundry, using transportation, managing medications and managing finances) [Reports changes in vision] : Reports changes in vision [Smoke Detector] : smoke detector [Carbon Monoxide Detector] : carbon monoxide detector [Safety elements used in home] : safety elements used in home [Seat Belt] :  uses seat belt [Sunscreen] : uses sunscreen [Patient/Caregiver not ready to engage] : , patient/caregiver not ready to engage [Never] : Never [de-identified] : socially  [Audit-CScore] : 1 [de-identified] : Gym and hiking pickle ball on weekends [de-identified] : Gluten free and low carbs  [[id number]] : 1 [Patient reported bone density results were abnormal] : Patient reported bone density results were abnormal [Patient reported colonoscopy was abnormal] : Patient reported colonoscopy was abnormal [Change in mental status noted] : No change in mental status noted [Language] : denies difficulty with language [Behavior] : denies difficulty with behavior [Learning/Retaining New Information] : denies difficulty learning/retaining new information [Handling Complex Tasks] : denies difficulty handling complex tasks [Reasoning] : denies difficulty with reasoning [Spatial Ability and Orientation] : denies difficulty with spatial ability and orientation [Reports changes in hearing] : Reports no changes in hearing [Reports changes in dental health] : Reports no changes in dental health [Guns at Home] : no guns at home [Travel to Developing Areas] : does not  travel to developing areas [TB Exposure] : is not being exposed to tuberculosis [MammogramDate] : 10/18/2022 [PapSmearDate] : 06/14/2021 [BoneDensityDate] : 09/08/2022 [ColonoscopyDate] : 08/26/2022 [ColonoscopyComments] : 5 year follow up [de-identified] : [place] [AdvancecareDate] : 06/28/2023

## 2023-10-10 RX ORDER — CITALOPRAM HYDROBROMIDE 20 MG/1
20 TABLET, FILM COATED ORAL DAILY
Qty: 90 | Refills: 3 | Status: ACTIVE | COMMUNITY
Start: 2022-09-13 | End: 1900-01-01

## 2023-11-01 ENCOUNTER — NON-APPOINTMENT (OUTPATIENT)
Age: 68
End: 2023-11-01

## 2023-11-01 ENCOUNTER — APPOINTMENT (OUTPATIENT)
Dept: OBGYN | Facility: CLINIC | Age: 68
End: 2023-11-01
Payer: MEDICARE

## 2023-11-01 VITALS
DIASTOLIC BLOOD PRESSURE: 70 MMHG | SYSTOLIC BLOOD PRESSURE: 100 MMHG | WEIGHT: 129 LBS | BODY MASS INDEX: 24.67 KG/M2 | HEIGHT: 60.5 IN

## 2023-11-01 DIAGNOSIS — Z01.419 ENCOUNTER FOR GYNECOLOGICAL EXAMINATION (GENERAL) (ROUTINE) W/OUT ABNORMAL FINDINGS: ICD-10-CM

## 2023-11-01 PROCEDURE — 99397 PER PM REEVAL EST PAT 65+ YR: CPT

## 2023-11-20 ENCOUNTER — TRANSCRIPTION ENCOUNTER (OUTPATIENT)
Age: 68
End: 2023-11-20

## 2023-12-19 ENCOUNTER — RESULT REVIEW (OUTPATIENT)
Age: 68
End: 2023-12-19

## 2023-12-28 ENCOUNTER — APPOINTMENT (OUTPATIENT)
Dept: BREAST CENTER | Facility: CLINIC | Age: 68
End: 2023-12-28
Payer: MEDICARE

## 2023-12-28 VITALS
WEIGHT: 128 LBS | DIASTOLIC BLOOD PRESSURE: 43 MMHG | BODY MASS INDEX: 24.48 KG/M2 | SYSTOLIC BLOOD PRESSURE: 101 MMHG | HEART RATE: 61 BPM | HEIGHT: 60.5 IN

## 2023-12-28 DIAGNOSIS — N60.19 DIFFUSE CYSTIC MASTOPATHY OF UNSPECIFIED BREAST: ICD-10-CM

## 2023-12-28 DIAGNOSIS — N95.2 POSTMENOPAUSAL ATROPHIC VAGINITIS: ICD-10-CM

## 2023-12-28 DIAGNOSIS — Z80.3 FAMILY HISTORY OF MALIGNANT NEOPLASM OF BREAST: ICD-10-CM

## 2023-12-28 DIAGNOSIS — R92.30 INCONCLUSIVE MAMMOGRAM: ICD-10-CM

## 2023-12-28 DIAGNOSIS — R92.2 INCONCLUSIVE MAMMOGRAM: ICD-10-CM

## 2023-12-28 DIAGNOSIS — Z86.018 PERSONAL HISTORY OF OTHER BENIGN NEOPLASM: ICD-10-CM

## 2023-12-28 DIAGNOSIS — M81.0 AGE-RELATED OSTEOPOROSIS W/OUT CURRENT PATHOLOGICAL FRACTURE: ICD-10-CM

## 2023-12-28 PROCEDURE — 99214 OFFICE O/P EST MOD 30 MIN: CPT

## 2023-12-28 RX ORDER — KETOROLAC TROMETHAMINE 15.75 MG/1
15.75 SPRAY, METERED NASAL TWICE DAILY
Qty: 1 | Refills: 1 | Status: DISCONTINUED | COMMUNITY
Start: 2020-11-18 | End: 2023-12-28

## 2023-12-28 RX ORDER — HYDROCORTISONE ACETATE 0.5 %
CREAM (GRAM) TOPICAL
Refills: 0 | Status: ACTIVE | COMMUNITY

## 2023-12-28 NOTE — PHYSICAL EXAM
[Normocephalic] : normocephalic [Atraumatic] : atraumatic [Supple] : supple [No Supraclavicular Adenopathy] : no supraclavicular adenopathy [No Cervical Adenopathy] : no cervical adenopathy [No Thyromegaly] : no thyromegaly [Normal Sinus Rhythm] : normal sinus rhythm [Examined in the supine and seated position] : examined in the supine and seated position [No dominant masses] : no dominant masses in right breast  [No dominant masses] : no dominant masses left breast [No Nipple Retraction] : no left nipple retraction [No Nipple Discharge] : no left nipple discharge [No Axillary Lymphadenopathy] : no left axillary lymphadenopathy [No Edema] : no edema [No Rashes] : no rashes [No Ulceration] : no ulceration [de-identified] : +dense FC tissue NSF  [de-identified] : +dense FC tissue NSF

## 2023-12-28 NOTE — HISTORY OF PRESENT ILLNESS
[FreeTextEntry1] : S/P R Br Bx w/NL (5/16/14): No resid papliloma S/P R Sono Core Bx (4/2/14): +papillary lesion +FH Br Ca (M. Aunt x 2 40's (BRCA-), M. FC 40's (BRCA-)) TC Risk: 21.4% BRCA ([facility])(3/20): -, +JOSE/RECQL4 VUS > discussed Colonoscopy (2022): "WNL" > 10yrs  Pelvic (2023): "WNL" > +c/o atr vaginitis > vag estradiol discussed/rec'ed +c/o change in taste/smell > NOT COVID Got Pfizer bivalent (10/22) No MH/FH changes. ROS reviewed/discussed. Taking Vit D. Vit D level (3/17): 55. Last Bone Densitometry (2016): +osteopenia Mammo/Sono (12/19/23): HD, NSF

## 2024-01-02 ENCOUNTER — TRANSCRIPTION ENCOUNTER (OUTPATIENT)
Age: 69
End: 2024-01-02

## 2024-01-03 ENCOUNTER — TRANSCRIPTION ENCOUNTER (OUTPATIENT)
Age: 69
End: 2024-01-03

## 2024-01-08 ENCOUNTER — TRANSCRIPTION ENCOUNTER (OUTPATIENT)
Age: 69
End: 2024-01-08

## 2024-01-09 ENCOUNTER — TRANSCRIPTION ENCOUNTER (OUTPATIENT)
Age: 69
End: 2024-01-09

## 2024-01-09 RX ORDER — BUPROPION HYDROCHLORIDE 150 MG/1
150 TABLET, FILM COATED, EXTENDED RELEASE ORAL DAILY
Qty: 90 | Refills: 1 | Status: ACTIVE | COMMUNITY
Start: 2022-01-19 | End: 1900-01-01

## 2024-01-16 ENCOUNTER — TRANSCRIPTION ENCOUNTER (OUTPATIENT)
Age: 69
End: 2024-01-16

## 2024-01-17 RX ORDER — ESTRADIOL 0.1 MG/G
0.1 CREAM VAGINAL
Qty: 3 | Refills: 3 | Status: ACTIVE | COMMUNITY
Start: 2024-01-09

## 2024-07-22 ENCOUNTER — RX RENEWAL (OUTPATIENT)
Age: 69
End: 2024-07-22

## 2024-08-19 NOTE — HISTORY OF PRESENT ILLNESS
<-- Click to add NO pertinent Family History [FreeTextEntry1] : establish care/pre-op [de-identified] : Patient is a 65F with osteoporosis, arthritis, depression and anxiety, leukopenia, + PARTHA\par \par Surgery: Left bunion surgery\par Surgeon: Dr. Gary Nguyễn\par Date of Surgery: 12/9/2020\par \par Patient is very active, eats a healthy diet\par Sees a functional medicine doctor\par

## 2024-09-18 ENCOUNTER — RESULT REVIEW (OUTPATIENT)
Age: 69
End: 2024-09-18

## 2024-09-24 ENCOUNTER — TRANSCRIPTION ENCOUNTER (OUTPATIENT)
Age: 69
End: 2024-09-24

## 2024-12-04 ENCOUNTER — APPOINTMENT (OUTPATIENT)
Dept: INTERNAL MEDICINE | Facility: CLINIC | Age: 69
End: 2024-12-04
Payer: MEDICARE

## 2024-12-04 VITALS
DIASTOLIC BLOOD PRESSURE: 68 MMHG | HEIGHT: 60.5 IN | WEIGHT: 135 LBS | HEART RATE: 66 BPM | BODY MASS INDEX: 25.82 KG/M2 | SYSTOLIC BLOOD PRESSURE: 110 MMHG | OXYGEN SATURATION: 98 %

## 2024-12-04 DIAGNOSIS — E61.1 IRON DEFICIENCY: ICD-10-CM

## 2024-12-04 DIAGNOSIS — M81.0 AGE-RELATED OSTEOPOROSIS W/OUT CURRENT PATHOLOGICAL FRACTURE: ICD-10-CM

## 2024-12-04 DIAGNOSIS — Z00.00 ENCOUNTER FOR GENERAL ADULT MEDICAL EXAMINATION W/OUT ABNORMAL FINDINGS: ICD-10-CM

## 2024-12-04 DIAGNOSIS — E78.2 MIXED HYPERLIPIDEMIA: ICD-10-CM

## 2024-12-04 DIAGNOSIS — R73.03 PREDIABETES.: ICD-10-CM

## 2024-12-04 PROCEDURE — 36415 COLL VENOUS BLD VENIPUNCTURE: CPT

## 2024-12-04 PROCEDURE — G0439: CPT

## 2024-12-05 ENCOUNTER — TRANSCRIPTION ENCOUNTER (OUTPATIENT)
Age: 69
End: 2024-12-05

## 2024-12-05 LAB
25(OH)D3 SERPL-MCNC: 57.3 NG/ML
ALBUMIN SERPL ELPH-MCNC: 4.3 G/DL
ALP BLD-CCNC: 95 U/L
ALT SERPL-CCNC: 14 U/L
ANION GAP SERPL CALC-SCNC: 11 MMOL/L
AST SERPL-CCNC: 19 U/L
BASOPHILS # BLD AUTO: 0.03 K/UL
BASOPHILS NFR BLD AUTO: 0.6 %
BILIRUB SERPL-MCNC: 0.3 MG/DL
BUN SERPL-MCNC: 17 MG/DL
CALCIUM SERPL-MCNC: 9.8 MG/DL
CHLORIDE SERPL-SCNC: 102 MMOL/L
CHOLEST SERPL-MCNC: 215 MG/DL
CO2 SERPL-SCNC: 28 MMOL/L
CREAT SERPL-MCNC: 0.86 MG/DL
EGFR: 73 ML/MIN/1.73M2
EOSINOPHIL # BLD AUTO: 0.07 K/UL
EOSINOPHIL NFR BLD AUTO: 1.5 %
ESTIMATED AVERAGE GLUCOSE: 108 MG/DL
FERRITIN SERPL-MCNC: 28 NG/ML
GLUCOSE SERPL-MCNC: 93 MG/DL
HBA1C MFR BLD HPLC: 5.4 %
HCT VFR BLD CALC: 40.8 %
HDLC SERPL-MCNC: 85 MG/DL
HGB BLD-MCNC: 13.1 G/DL
IMM GRANULOCYTES NFR BLD AUTO: 0.2 %
IRON SATN MFR SERPL: 19 %
IRON SERPL-MCNC: 73 UG/DL
LDLC SERPL CALC-MCNC: 123 MG/DL
LYMPHOCYTES # BLD AUTO: 1.86 K/UL
LYMPHOCYTES NFR BLD AUTO: 40 %
MAN DIFF?: NORMAL
MCHC RBC-ENTMCNC: 30.5 PG
MCHC RBC-ENTMCNC: 32.1 G/DL
MCV RBC AUTO: 94.9 FL
MONOCYTES # BLD AUTO: 0.61 K/UL
MONOCYTES NFR BLD AUTO: 13.1 %
NEUTROPHILS # BLD AUTO: 2.07 K/UL
NEUTROPHILS NFR BLD AUTO: 44.6 %
NONHDLC SERPL-MCNC: 130 MG/DL
PLATELET # BLD AUTO: 204 K/UL
POTASSIUM SERPL-SCNC: 4.6 MMOL/L
PROT SERPL-MCNC: 6.7 G/DL
RBC # BLD: 4.3 M/UL
RBC # FLD: 12.8 %
SODIUM SERPL-SCNC: 141 MMOL/L
TIBC SERPL-MCNC: 389 UG/DL
TRIGL SERPL-MCNC: 39 MG/DL
UIBC SERPL-MCNC: 316 UG/DL
WBC # FLD AUTO: 4.65 K/UL

## 2024-12-06 ENCOUNTER — TRANSCRIPTION ENCOUNTER (OUTPATIENT)
Age: 69
End: 2024-12-06

## 2024-12-23 ENCOUNTER — TRANSCRIPTION ENCOUNTER (OUTPATIENT)
Age: 69
End: 2024-12-23

## 2024-12-26 ENCOUNTER — RESULT REVIEW (OUTPATIENT)
Age: 69
End: 2024-12-26

## 2024-12-30 ENCOUNTER — APPOINTMENT (OUTPATIENT)
Dept: DERMATOLOGY | Facility: CLINIC | Age: 69
End: 2024-12-30
Payer: MEDICARE

## 2024-12-30 ENCOUNTER — APPOINTMENT (OUTPATIENT)
Dept: DERMATOLOGY | Facility: CLINIC | Age: 69
End: 2024-12-30

## 2024-12-30 VITALS — HEIGHT: 60.5 IN | BODY MASS INDEX: 25.82 KG/M2 | WEIGHT: 135 LBS

## 2024-12-30 DIAGNOSIS — L60.3 NAIL DYSTROPHY: ICD-10-CM

## 2024-12-30 DIAGNOSIS — D24.1 BENIGN NEOPLASM OF RIGHT BREAST: ICD-10-CM

## 2024-12-30 DIAGNOSIS — L82.1 OTHER SEBORRHEIC KERATOSIS: ICD-10-CM

## 2024-12-30 DIAGNOSIS — D22.9 MELANOCYTIC NEVI, UNSPECIFIED: ICD-10-CM

## 2024-12-30 PROCEDURE — 99203 OFFICE O/P NEW LOW 30 MIN: CPT

## 2025-01-08 ENCOUNTER — TRANSCRIPTION ENCOUNTER (OUTPATIENT)
Age: 70
End: 2025-01-08

## 2025-01-08 ENCOUNTER — APPOINTMENT (OUTPATIENT)
Dept: BREAST CENTER | Facility: CLINIC | Age: 70
End: 2025-01-08
Payer: MEDICARE

## 2025-01-08 VITALS
SYSTOLIC BLOOD PRESSURE: 123 MMHG | DIASTOLIC BLOOD PRESSURE: 59 MMHG | BODY MASS INDEX: 25.82 KG/M2 | HEART RATE: 74 BPM | HEIGHT: 60.5 IN | WEIGHT: 135 LBS

## 2025-01-08 DIAGNOSIS — N60.19 DIFFUSE CYSTIC MASTOPATHY OF UNSPECIFIED BREAST: ICD-10-CM

## 2025-01-08 DIAGNOSIS — R92.30 INCONCLUSIVE MAMMOGRAM: ICD-10-CM

## 2025-01-08 DIAGNOSIS — Z80.3 FAMILY HISTORY OF MALIGNANT NEOPLASM OF BREAST: ICD-10-CM

## 2025-01-08 DIAGNOSIS — R92.2 INCONCLUSIVE MAMMOGRAM: ICD-10-CM

## 2025-01-08 PROCEDURE — 99213 OFFICE O/P EST LOW 20 MIN: CPT

## 2025-01-08 RX ORDER — BERBERINE CHLOR/SEAWEED/CHROM 500-250 MG
CAPSULE ORAL
Refills: 0 | Status: ACTIVE | COMMUNITY

## 2025-01-08 RX ORDER — CHOLECALCIFEROL (VITAMIN D3) 10(400)/ML
DROPS ORAL
Refills: 0 | Status: ACTIVE | COMMUNITY

## 2025-01-08 RX ORDER — ACETAMINOPHEN AND PHENYLEPHRINE HYDROCHLORIDE 325; 5 MG/1; MG/1
TABLET, COATED ORAL
Refills: 0 | Status: ACTIVE | COMMUNITY

## 2025-03-18 ENCOUNTER — APPOINTMENT (OUTPATIENT)
Dept: DERMATOLOGY | Facility: CLINIC | Age: 70
End: 2025-03-18